# Patient Record
Sex: FEMALE | Race: WHITE | NOT HISPANIC OR LATINO | Employment: FULL TIME | URBAN - METROPOLITAN AREA
[De-identification: names, ages, dates, MRNs, and addresses within clinical notes are randomized per-mention and may not be internally consistent; named-entity substitution may affect disease eponyms.]

---

## 2017-08-03 ENCOUNTER — RECORDS - HEALTHEAST (OUTPATIENT)
Dept: LAB | Facility: CLINIC | Age: 42
End: 2017-08-03

## 2017-08-04 LAB — HIV 1+2 AB+HIV1 P24 AG SERPL QL IA: NEGATIVE

## 2017-08-05 LAB — SYPHILIS RPR SCREEN - HISTORICAL: NORMAL

## 2018-01-04 ENCOUNTER — OFFICE VISIT (OUTPATIENT)
Dept: URGENT CARE | Facility: URGENT CARE | Age: 43
End: 2018-01-04
Payer: COMMERCIAL

## 2018-01-04 VITALS
WEIGHT: 209 LBS | OXYGEN SATURATION: 100 % | TEMPERATURE: 97.5 F | DIASTOLIC BLOOD PRESSURE: 81 MMHG | SYSTOLIC BLOOD PRESSURE: 131 MMHG | HEART RATE: 87 BPM | HEIGHT: 67 IN | BODY MASS INDEX: 32.8 KG/M2

## 2018-01-04 DIAGNOSIS — R30.0 DYSURIA: Primary | ICD-10-CM

## 2018-01-04 DIAGNOSIS — N30.00 ACUTE CYSTITIS WITHOUT HEMATURIA: ICD-10-CM

## 2018-01-04 DIAGNOSIS — R82.90 NONSPECIFIC FINDING ON EXAMINATION OF URINE: ICD-10-CM

## 2018-01-04 LAB
ALBUMIN UR-MCNC: NEGATIVE MG/DL
APPEARANCE UR: CLEAR
BACTERIA #/AREA URNS HPF: ABNORMAL /HPF
BILIRUB UR QL STRIP: NEGATIVE
COLOR UR AUTO: YELLOW
GLUCOSE UR STRIP-MCNC: NEGATIVE MG/DL
HGB UR QL STRIP: ABNORMAL
KETONES UR STRIP-MCNC: NEGATIVE MG/DL
LEUKOCYTE ESTERASE UR QL STRIP: NEGATIVE
NITRATE UR QL: POSITIVE
NON-SQ EPI CELLS #/AREA URNS LPF: ABNORMAL /LPF
PH UR STRIP: 6 PH (ref 5–7)
RBC #/AREA URNS AUTO: ABNORMAL /HPF
SOURCE: ABNORMAL
SP GR UR STRIP: 1.01 (ref 1–1.03)
TRANS CELLS #/AREA URNS HPF: ABNORMAL /HPF
UROBILINOGEN UR STRIP-ACNC: 0.2 EU/DL (ref 0.2–1)
WBC #/AREA URNS AUTO: ABNORMAL /HPF

## 2018-01-04 PROCEDURE — 81001 URINALYSIS AUTO W/SCOPE: CPT | Performed by: FAMILY MEDICINE

## 2018-01-04 PROCEDURE — 87186 SC STD MICRODIL/AGAR DIL: CPT | Performed by: PHYSICIAN ASSISTANT

## 2018-01-04 PROCEDURE — 87086 URINE CULTURE/COLONY COUNT: CPT | Performed by: PHYSICIAN ASSISTANT

## 2018-01-04 PROCEDURE — 99203 OFFICE O/P NEW LOW 30 MIN: CPT | Performed by: PHYSICIAN ASSISTANT

## 2018-01-04 PROCEDURE — 87088 URINE BACTERIA CULTURE: CPT | Performed by: PHYSICIAN ASSISTANT

## 2018-01-04 RX ORDER — CEFDINIR 300 MG/1
300 CAPSULE ORAL 2 TIMES DAILY
Qty: 14 CAPSULE | Refills: 0 | Status: SHIPPED | OUTPATIENT
Start: 2018-01-04 | End: 2018-01-11

## 2018-01-04 RX ORDER — PHENAZOPYRIDINE HYDROCHLORIDE 200 MG/1
200 TABLET, FILM COATED ORAL 3 TIMES DAILY PRN
Qty: 6 TABLET | Refills: 0 | Status: SHIPPED | OUTPATIENT
Start: 2018-01-04 | End: 2018-01-06

## 2018-01-04 NOTE — MR AVS SNAPSHOT
"              After Visit Summary   2018    Mahogany Leal    MRN: [id number]           Patient Information     Date Of Birth          1975        Visit Information        Provider Department      2018 5:20 PM Олег Marlow PA-C [facility]        Today's Diagnoses     Dysuria    -  1    Acute cystitis without hematuria           Follow-ups after your visit        Who to contact     If you have questions or need follow up information about today's clinic visit or your schedule please contact [facility] directly at [phone].  Normal or non-critical lab and imaging results will be communicated to you by [employer], letter or phone within 4 business days after the clinic has received the results. If you do not hear from us within 7 days, please contact the clinic through [employer] or phone. If you have a critical or abnormal lab result, we will notify you by phone as soon as possible.  Submit refill requests through [employer] or call your pharmacy and they will forward the refill request to us. Please allow 3 business days for your refill to be completed.          Additional Information About Your Visit        MyChart Information     [employer] lets you send messages to your doctor, view your test results, renew your prescriptions, schedule appointments and more. To sign up, go to [email or web address] . Click on \"Log in\" on the left side of the screen, which will take you to the Welcome page. Then click on \"Sign up Now\" on the right side of the page.     You will be asked to enter the access code listed below, as well as some personal information. Please follow the directions to create your username and password.     Your access code is: [email or web address]  Expires: 2018  6:26 PM     Your access code will  in 90 days. If you need help or a new code, please call your [place] clinic or [phone].        Care EveryWhere ID     This is your Care EveryWhere " "ID. This could be used by other organizations to access your [place] medical records  [id number]        Your Vitals Were     Pulse Temperature Height Pulse Oximetry Breastfeeding? BMI (Body Mass Index)    87 97.5  F (36.4  C) (Tympanic) 5' 7\" (1.702 m) 100% No 32.73 kg/m2       Blood Pressure from Last 3 Encounters:   01/04/18 131/81    Weight from Last 3 Encounters:   01/04/18 209 lb (94.8 kg)              We Performed the Following     *UA reflex to Microscopic and Culture ([place] [facility] (except [place] and [place])     Urine Microscopic          Today's Medication Changes          These changes are accurate as of: 1/4/18  6:26 PM.  If you have any questions, ask your nurse or doctor.               Start taking these medicines.        Dose/Directions    cefdinir 300 MG capsule   Commonly known as:  OMNICEF   Used for:  Acute cystitis without hematuria, Dysuria   Started by:  Олег Marlow PA-C        Dose:  300 mg   Take 1 capsule (300 mg) by mouth 2 times daily for 7 days   Quantity:  14 capsule   Refills:  0       phenazopyridine 200 MG tablet   Commonly known as:  PYRIDIUM   Used for:  Dysuria, Acute cystitis without hematuria   Started by:  Олег Marlow PA-C        Dose:  200 mg   Take 1 tablet (200 mg) by mouth 3 times daily as needed for irritation   Quantity:  6 tablet   Refills:  0            Where to get your medicines      These medications were sent to [facility] [address] [place] [address], [place] - [address] [id number] [facility] [address]     Phone:  [phone]     cefdinir 300 MG capsule    phenazopyridine 200 MG tablet                Primary Care Provider Fax #    Physician No Ref-Primary [phone]       No address on file        Equal Access to Services     GERTRUDE LOPEZ : Jory Jaime, ana ferguson, brennen romero, emerson johnson. So [facility] [phone].    ATENCIÓN: Si nikki molina, " tiene a burnette disposición servicios gratuitos de asistencia lingüística. Amy ivey [phone].    We comply with applicable federal civil rights laws and [place] laws. We do not discriminate on the basis of race, color, national origin, age, disability, sex, sexual orientation, or gender identity.            Thank you!     Thank you for choosing [facility]  for your care. Our goal is always to provide you with excellent care. Hearing back from our patients is one way we can continue to improve our services. Please take a few minutes to complete the written survey that you may receive in the mail after your visit with us. Thank you!             Your Updated Medication List - Protect others around you: Learn how to safely use, store and throw away your medicines at www.disposemymeds.org.          This list is accurate as of: 1/4/18  6:26 PM.  Always use your most recent med list.                   Brand Name Dispense Instructions for use Diagnosis    cefdinir 300 MG capsule    OMNICEF    14 capsule    Take 1 capsule (300 mg) by mouth 2 times daily for 7 days    Acute cystitis without hematuria, Dysuria       phenazopyridine 200 MG tablet    PYRIDIUM    6 tablet    Take 1 tablet (200 mg) by mouth 3 times daily as needed for irritation    Dysuria, Acute cystitis without hematuria       VALACYCLOVIR HCL PO

## 2018-01-04 NOTE — NURSING NOTE
"Chief Complaint   Patient presents with     Urgent Care     UTI     c/o dysuria for 4 day       Initial /81  Pulse 87  Temp 97.5  F (36.4  C) (Tympanic)  Ht 5' 7\" (1.702 m)  Wt 209 lb (94.8 kg)  SpO2 100%  Breastfeeding? No  BMI 32.73 kg/m2 Estimated body mass index is 32.73 kg/(m^2) as calculated from the following:    Height as of this encounter: 5' 7\" (1.702 m).    Weight as of this encounter: 209 lb (94.8 kg).  Medication Reconciliation: complete   Karrie Hernandez MA    "

## 2018-01-05 NOTE — PROGRESS NOTES
"SUBJECTIVE:   Mahogany Leal is a 42 year old female who  presents today for a possible UTI. Symptoms of dysuria. Abdominal pressure, and nausea have been going on for 4 day(s).   Hematuria no.  gradual onsetand moderate.  There is no history of fever, chills, nausea or vomiting.  No history of vaginal or penile discharge. This patient does  have a history of urinary tract infections. Taking trimethoprim after intercourse regularly. Patient denies flank pain and temperature > 101 degrees F. or vaginal discharge, vaginal odor and vaginal itching     No past medical history on file.  Current Outpatient Prescriptions   Medication Sig Dispense Refill     VALACYCLOVIR HCL PO        Social History   Substance Use Topics     Smoking status: Never Smoker     Smokeless tobacco: Never Used     Alcohol use Not on file       ROS:   CONSTITUTIONAL:POSITIVE  for malaise  : dysuria    OBJECTIVE:  /81  Pulse 87  Temp 97.5  F (36.4  C) (Tympanic)  Ht 5' 7\" (1.702 m)  Wt 209 lb (94.8 kg)  SpO2 100%  Breastfeeding? No  BMI 32.73 kg/m2  GENERAL APPEARANCE: healthy, alert and no distress  RESP: lungs clear to auscultation - no rales, rhonchi or wheezes  CV: regular rates and rhythm, normal S1 S2, no murmur noted  ABDOMEN: tenderness mild suprapubic  BACK: No CVA tenderness  SKIN: no suspicious lesions or rashes    ASSESSMENT:     1. Dysuria    - *UA reflex to Microscopic and Culture ([place] [facility] (except [place] and [place])  - phenazopyridine (PYRIDIUM) 200 MG tablet; Take 1 tablet (200 mg) by mouth 3 times daily as needed for irritation  Dispense: 6 tablet; Refill: 0  - cefdinir (OMNICEF) 300 MG capsule; Take 1 capsule (300 mg) by mouth 2 times daily for 7 days  Dispense: 14 capsule; Refill: 0  - Urine Microscopic    2. Acute cystitis without hematuria    - phenazopyridine (PYRIDIUM) 200 MG tablet; Take 1 tablet (200 mg) by mouth 3 times daily as needed for irritation  Dispense: 6 tablet; Refill: 0  - " cefdinir (OMNICEF) 300 MG capsule; Take 1 capsule (300 mg) by mouth 2 times daily for 7 days  Dispense: 14 capsule; Refill: 0    PLAN:  As per ordered above  Drink plenty of fluids.  Prevention and treatment of UTI's discussed.Signs and symptoms of pyelonephritis mentioned.  Follow up with primary care physician if not improving over the next 3 days.

## 2018-01-07 LAB
BACTERIA SPEC CULT: ABNORMAL
SPECIMEN SOURCE: ABNORMAL

## 2018-09-13 ENCOUNTER — RECORDS - HEALTHEAST (OUTPATIENT)
Dept: LAB | Facility: CLINIC | Age: 43
End: 2018-09-13

## 2018-09-14 LAB
CHOLEST SERPL-MCNC: 182 MG/DL
FASTING STATUS PATIENT QL REPORTED: NORMAL
HDLC SERPL-MCNC: 58 MG/DL
LDLC SERPL CALC-MCNC: 101 MG/DL
TRIGL SERPL-MCNC: 117 MG/DL

## 2018-10-25 ENCOUNTER — RECORDS - HEALTHEAST (OUTPATIENT)
Dept: LAB | Facility: CLINIC | Age: 43
End: 2018-10-25

## 2018-10-27 LAB — BACTERIA SPEC CULT: ABNORMAL

## 2020-04-27 ENCOUNTER — RECORDS - HEALTHEAST (OUTPATIENT)
Dept: LAB | Facility: CLINIC | Age: 45
End: 2020-04-27

## 2020-04-28 LAB
ALBUMIN SERPL-MCNC: 3.8 G/DL (ref 3.5–5)
ALP SERPL-CCNC: 140 U/L (ref 45–120)
ALT SERPL W P-5'-P-CCNC: 152 U/L (ref 0–45)
ANION GAP SERPL CALCULATED.3IONS-SCNC: 14 MMOL/L (ref 5–18)
AST SERPL W P-5'-P-CCNC: 39 U/L (ref 0–40)
BILIRUB SERPL-MCNC: 0.4 MG/DL (ref 0–1)
BUN SERPL-MCNC: 19 MG/DL (ref 8–22)
CALCIUM SERPL-MCNC: 8.7 MG/DL (ref 8.5–10.5)
CHLORIDE BLD-SCNC: 104 MMOL/L (ref 98–107)
CO2 SERPL-SCNC: 18 MMOL/L (ref 22–31)
CREAT SERPL-MCNC: 0.68 MG/DL (ref 0.6–1.1)
GFR SERPL CREATININE-BSD FRML MDRD: >60 ML/MIN/1.73M2
GLUCOSE BLD-MCNC: 96 MG/DL (ref 70–125)
LIPASE SERPL-CCNC: 15 U/L (ref 0–52)
POTASSIUM BLD-SCNC: 3.8 MMOL/L (ref 3.5–5)
PROT SERPL-MCNC: 6.9 G/DL (ref 6–8)
SODIUM SERPL-SCNC: 136 MMOL/L (ref 136–145)

## 2020-05-05 ENCOUNTER — AMBULATORY - HEALTHEAST (OUTPATIENT)
Dept: ADMINISTRATIVE | Facility: CLINIC | Age: 45
End: 2020-05-05

## 2020-05-05 DIAGNOSIS — K80.18 CALCULUS OF GALLBLADDER WITH OTHER CHOLECYSTITIS WITHOUT OBSTRUCTION: ICD-10-CM

## 2020-05-08 ENCOUNTER — AMBULATORY - HEALTHEAST (OUTPATIENT)
Dept: SURGERY | Facility: CLINIC | Age: 45
End: 2020-05-08

## 2020-10-26 ENCOUNTER — TRANSFERRED RECORDS (OUTPATIENT)
Dept: NEUROLOGY | Facility: CLINIC | Age: 45
End: 2020-10-26

## 2020-11-06 ENCOUNTER — TRANSFERRED RECORDS (OUTPATIENT)
Dept: NEUROLOGY | Facility: CLINIC | Age: 45
End: 2020-11-06

## 2020-11-25 ENCOUNTER — RECORDS - HEALTHEAST (OUTPATIENT)
Dept: LAB | Facility: CLINIC | Age: 45
End: 2020-11-25

## 2020-11-25 LAB
ALBUMIN SERPL-MCNC: 3.9 G/DL (ref 3.5–5)
ALP SERPL-CCNC: 77 U/L (ref 45–120)
ALT SERPL W P-5'-P-CCNC: 15 U/L (ref 0–45)
ANION GAP SERPL CALCULATED.3IONS-SCNC: 12 MMOL/L (ref 5–18)
AST SERPL W P-5'-P-CCNC: 19 U/L (ref 0–40)
BILIRUB SERPL-MCNC: 0.3 MG/DL (ref 0–1)
BUN SERPL-MCNC: 20 MG/DL (ref 8–22)
CALCIUM SERPL-MCNC: 9 MG/DL (ref 8.5–10.5)
CHLORIDE BLD-SCNC: 103 MMOL/L (ref 98–107)
CO2 SERPL-SCNC: 24 MMOL/L (ref 22–31)
CREAT SERPL-MCNC: 0.74 MG/DL (ref 0.6–1.1)
GFR SERPL CREATININE-BSD FRML MDRD: >60 ML/MIN/1.73M2
GLUCOSE BLD-MCNC: 82 MG/DL (ref 70–125)
POTASSIUM BLD-SCNC: 3.9 MMOL/L (ref 3.5–5)
PROT SERPL-MCNC: 7.3 G/DL (ref 6–8)
SODIUM SERPL-SCNC: 139 MMOL/L (ref 136–145)

## 2020-11-27 LAB
HAV IGM SERPL QL IA: NEGATIVE
HBV CORE IGM SERPL QL IA: NEGATIVE
HBV SURFACE AG SERPL QL IA: NEGATIVE
HCV AB SERPL QL IA: NEGATIVE

## 2021-04-26 ENCOUNTER — COMMUNICATION - HEALTHEAST (OUTPATIENT)
Dept: CARDIOLOGY | Facility: CLINIC | Age: 46
End: 2021-04-26

## 2021-05-25 ENCOUNTER — RECORDS - HEALTHEAST (OUTPATIENT)
Dept: ADMINISTRATIVE | Facility: CLINIC | Age: 46
End: 2021-05-25

## 2021-05-26 ENCOUNTER — RECORDS - HEALTHEAST (OUTPATIENT)
Dept: ADMINISTRATIVE | Facility: CLINIC | Age: 46
End: 2021-05-26

## 2021-06-01 ENCOUNTER — RECORDS - HEALTHEAST (OUTPATIENT)
Dept: ADMINISTRATIVE | Facility: CLINIC | Age: 46
End: 2021-06-01

## 2021-06-02 ENCOUNTER — RECORDS - HEALTHEAST (OUTPATIENT)
Dept: ADMINISTRATIVE | Facility: CLINIC | Age: 46
End: 2021-06-02

## 2021-06-16 PROBLEM — A60.00 GENITAL HERPES SIMPLEX: Status: ACTIVE | Noted: 2020-05-08

## 2021-06-16 PROBLEM — J20.9 ACUTE BRONCHITIS: Status: ACTIVE | Noted: 2020-05-08

## 2021-06-16 PROBLEM — F41.9 ANXIETY: Status: ACTIVE | Noted: 2020-05-08

## 2021-06-16 PROBLEM — D25.9 UTERINE LEIOMYOMA: Status: ACTIVE | Noted: 2020-05-08

## 2021-06-19 ENCOUNTER — HEALTH MAINTENANCE LETTER (OUTPATIENT)
Age: 46
End: 2021-06-19

## 2021-10-10 ENCOUNTER — HEALTH MAINTENANCE LETTER (OUTPATIENT)
Age: 46
End: 2021-10-10

## 2021-12-17 ENCOUNTER — MEDICAL CORRESPONDENCE (OUTPATIENT)
Dept: HEALTH INFORMATION MANAGEMENT | Facility: CLINIC | Age: 46
End: 2021-12-17
Payer: COMMERCIAL

## 2021-12-17 ENCOUNTER — TRANSCRIBE ORDERS (OUTPATIENT)
Dept: OTHER | Age: 46
End: 2021-12-17

## 2021-12-17 DIAGNOSIS — R51.9 CHRONIC DAILY HEADACHE: Primary | ICD-10-CM

## 2022-01-12 ENCOUNTER — LAB REQUISITION (OUTPATIENT)
Dept: LAB | Facility: CLINIC | Age: 47
End: 2022-01-12
Payer: COMMERCIAL

## 2022-01-12 DIAGNOSIS — N39.0 URINARY TRACT INFECTION, SITE NOT SPECIFIED: ICD-10-CM

## 2022-01-12 PROCEDURE — 87086 URINE CULTURE/COLONY COUNT: CPT | Mod: ORL | Performed by: FAMILY MEDICINE

## 2022-01-14 LAB — BACTERIA UR CULT: ABNORMAL

## 2022-03-25 ENCOUNTER — TRANSFERRED RECORDS (OUTPATIENT)
Dept: NEUROLOGY | Facility: CLINIC | Age: 47
End: 2022-03-25

## 2022-04-01 ENCOUNTER — TRANSFERRED RECORDS (OUTPATIENT)
Dept: NEUROLOGY | Facility: CLINIC | Age: 47
End: 2022-04-01

## 2022-06-27 ENCOUNTER — LAB REQUISITION (OUTPATIENT)
Dept: LAB | Facility: CLINIC | Age: 47
End: 2022-06-27
Payer: COMMERCIAL

## 2022-06-27 DIAGNOSIS — N30.90 CYSTITIS, UNSPECIFIED WITHOUT HEMATURIA: ICD-10-CM

## 2022-06-27 PROCEDURE — 87186 SC STD MICRODIL/AGAR DIL: CPT | Mod: ORL | Performed by: FAMILY MEDICINE

## 2022-06-27 PROCEDURE — 87086 URINE CULTURE/COLONY COUNT: CPT | Mod: ORL | Performed by: FAMILY MEDICINE

## 2022-06-29 LAB — BACTERIA UR CULT: ABNORMAL

## 2022-06-30 ENCOUNTER — TRANSFERRED RECORDS (OUTPATIENT)
Dept: NEUROLOGY | Facility: CLINIC | Age: 47
End: 2022-06-30

## 2022-07-08 ENCOUNTER — LAB (OUTPATIENT)
Dept: LAB | Facility: HOSPITAL | Age: 47
End: 2022-07-08
Payer: COMMERCIAL

## 2022-07-08 ENCOUNTER — OFFICE VISIT (OUTPATIENT)
Dept: NEUROLOGY | Facility: CLINIC | Age: 47
End: 2022-07-08
Attending: PHYSICIAN ASSISTANT
Payer: COMMERCIAL

## 2022-07-08 VITALS
SYSTOLIC BLOOD PRESSURE: 121 MMHG | DIASTOLIC BLOOD PRESSURE: 82 MMHG | HEIGHT: 67 IN | BODY MASS INDEX: 32.83 KG/M2 | HEART RATE: 77 BPM | WEIGHT: 209.2 LBS

## 2022-07-08 DIAGNOSIS — G43.719 INTRACTABLE CHRONIC MIGRAINE WITHOUT AURA AND WITHOUT STATUS MIGRAINOSUS: Primary | ICD-10-CM

## 2022-07-08 DIAGNOSIS — G43.719 INTRACTABLE CHRONIC MIGRAINE WITHOUT AURA AND WITHOUT STATUS MIGRAINOSUS: ICD-10-CM

## 2022-07-08 DIAGNOSIS — M79.10 MUSCLE PAIN: ICD-10-CM

## 2022-07-08 LAB
ALBUMIN SERPL-MCNC: 3.7 G/DL (ref 3.5–5)
ALP SERPL-CCNC: 47 U/L (ref 45–120)
ALT SERPL W P-5'-P-CCNC: 17 U/L (ref 0–45)
ANION GAP SERPL CALCULATED.3IONS-SCNC: 5 MMOL/L (ref 5–18)
AST SERPL W P-5'-P-CCNC: 18 U/L (ref 0–40)
BILIRUB SERPL-MCNC: 0.4 MG/DL (ref 0–1)
BUN SERPL-MCNC: 15 MG/DL (ref 8–22)
CALCIUM SERPL-MCNC: 8.8 MG/DL (ref 8.5–10.5)
CHLORIDE BLD-SCNC: 107 MMOL/L (ref 98–107)
CK SERPL-CCNC: 37 U/L (ref 30–190)
CO2 SERPL-SCNC: 27 MMOL/L (ref 22–31)
CREAT SERPL-MCNC: 0.75 MG/DL (ref 0.6–1.1)
FERRITIN SERPL-MCNC: 131 NG/ML (ref 6–175)
GFR SERPL CREATININE-BSD FRML MDRD: >90 ML/MIN/1.73M2
GLUCOSE BLD-MCNC: 89 MG/DL (ref 70–125)
HBA1C MFR BLD: 5.1 %
MAGNESIUM SERPL-MCNC: 2.1 MG/DL (ref 1.8–2.6)
POTASSIUM BLD-SCNC: 4 MMOL/L (ref 3.5–5)
PROT SERPL-MCNC: 7.1 G/DL (ref 6–8)
SODIUM SERPL-SCNC: 139 MMOL/L (ref 136–145)
TOTAL PROTEIN SERUM FOR ELP: 6.9 G/DL (ref 6.4–8.3)
TSH SERPL DL<=0.005 MIU/L-ACNC: 2.76 UIU/ML (ref 0.3–5)
VIT B12 SERPL-MCNC: 411 PG/ML (ref 232–1245)

## 2022-07-08 PROCEDURE — 84155 ASSAY OF PROTEIN SERUM: CPT | Mod: 91

## 2022-07-08 PROCEDURE — 82550 ASSAY OF CK (CPK): CPT

## 2022-07-08 PROCEDURE — 86334 IMMUNOFIX E-PHORESIS SERUM: CPT | Performed by: PATHOLOGY

## 2022-07-08 PROCEDURE — 84443 ASSAY THYROID STIM HORMONE: CPT

## 2022-07-08 PROCEDURE — 82728 ASSAY OF FERRITIN: CPT

## 2022-07-08 PROCEDURE — 36415 COLL VENOUS BLD VENIPUNCTURE: CPT

## 2022-07-08 PROCEDURE — 83735 ASSAY OF MAGNESIUM: CPT

## 2022-07-08 PROCEDURE — 83036 HEMOGLOBIN GLYCOSYLATED A1C: CPT

## 2022-07-08 PROCEDURE — 82607 VITAMIN B-12: CPT

## 2022-07-08 PROCEDURE — 84425 ASSAY OF VITAMIN B-1: CPT

## 2022-07-08 PROCEDURE — 86038 ANTINUCLEAR ANTIBODIES: CPT

## 2022-07-08 PROCEDURE — 84165 PROTEIN E-PHORESIS SERUM: CPT | Mod: TC | Performed by: PATHOLOGY

## 2022-07-08 PROCEDURE — 99205 OFFICE O/P NEW HI 60 MIN: CPT | Performed by: PSYCHIATRY & NEUROLOGY

## 2022-07-08 PROCEDURE — 80053 COMPREHEN METABOLIC PANEL: CPT

## 2022-07-08 RX ORDER — NORTRIPTYLINE HCL 10 MG
CAPSULE ORAL
Qty: 180 CAPSULE | Refills: 1 | Status: SHIPPED | OUTPATIENT
Start: 2022-07-08 | End: 2022-09-20

## 2022-07-08 RX ORDER — DULOXETIN HYDROCHLORIDE 60 MG/1
60 CAPSULE, DELAYED RELEASE ORAL
COMMUNITY
End: 2024-01-15

## 2022-07-08 RX ORDER — TRIMETHOPRIM 100 MG/1
100 TABLET ORAL
COMMUNITY
End: 2024-01-15

## 2022-07-08 RX ORDER — MELOXICAM 10 MG/1
CAPSULE ORAL
COMMUNITY

## 2022-07-08 NOTE — PROGRESS NOTES
NEUROLOGY OUTPATIENT CONSULT NOTE   Jul 8, 2022     CHIEF COMPLAINT/REASON FOR VISIT/REASON FOR CONSULT  Patient presents with:  Headache: New patient     REASON FOR CONSULTATION- Headaches and body pain     REFERRAL SOURCE  Dr. Dallin Loredo   Dr. Dallin Loredo    HISTORY OF PRESENT ILLNESS  Mahogany Leal is a 47 year old female seen today for evaluation of headaches and body pain.  All her symptoms started 15 years ago.  There was no provoking factor 15 years ago.    1.  Headaches these are almost every day.  They can be more in the back of the head but sometimes radiate to the front.  They are more of a pressure with no significant photophobia or phonophobia.  She occasionally does get visual auras.  They can be all day long.  They do go away when she goes to sleep.  Denies any other provoking factors.  Has not tried any medications.  Does use Tylenol for abortive therapy.  She does use Tylenol multiple times a day.  She is also using it for dental bridging pain.  There is question of TMJ also.  Has had a MRI 15 years ago that was negative.    2.  She reports generalized arm and leg pain.  This is more of a dull bruising pain though occasionally can be sharp.  Activity does make it worse.  There is no associated numbness or weakness though reports that sometimes her arms feel heavy.  She does complain of some neck pain and low back pain.  She has had some MRIs of her low back done through [facility].  Recently had an x-ray of her neck done through [facility] both of these were noncontributory (per patient report).  Was seen by her rheumatologist 15 years ago and was thought to have fibromyalgia versus connective tissue disease.  Sitting for too long as well because cramping in the back of her legs.    Previous history is reviewed and this is unchanged.    PAST MEDICAL/SURGICAL HISTORY  Past Medical History:   Diagnosis Date     Anxiety      Chronic headaches      Costochondritis       "Genital herpes simplex type 1 infection      Mononucleosis      Recurrent UTI      Patient Active Problem List   Diagnosis     Abnormal uterine bleeding     Anxiety     Genital herpes simplex     Acute bronchitis     Uterine leiomyoma   Significant for frequent UTIs    FAMILY HISTORY  Family History   Problem Relation Age of Onset     Cancer Mother         Soft Palate     Aneurysm Mother         Brain     Nephrolithiasis Father      Colon Cancer Father 58.00     Colon Cancer Maternal Grandmother 65.00     Diabetes Maternal Grandfather      Cerebrovascular Disease Maternal Grandfather    Family history positive for rheumatoid arthritis in her mother.  No family history of headaches.    SOCIAL HISTORY  Social History     Tobacco Use     Smoking status: Never Smoker     Smokeless tobacco: Never Used   Substance Use Topics     Alcohol use: Not Currently     Comment: Alcoholic Drinks/day: rare     Drug use: No       SYSTEMS REVIEW  Twelve-system ROS was done and other than the HPI this was negative except for some back pain and arm and leg pain and sometimes neck pain, bladder symptoms, headaches, anxiety.  No sleep issues.    MEDICATIONS  DULoxetine (CYMBALTA) 60 MG capsule, Take 60 mg by mouth  trimethoprim (TRIMPEX) 100 MG tablet, Take 100 mg by mouth  VALACYCLOVIR HCL PO,   Meloxicam 10 MG CAPS, 1 tablet daily (Patient not taking: Reported on 7/8/2022)    No current facility-administered medications on file prior to visit.       PHYSICAL EXAMINATION  VITALS: /82 (BP Location: Right arm, Patient Position: Sitting)   Pulse 77   Ht 1.702 m (5' 7\")   Wt 94.9 kg (209 lb 3.2 oz)   BMI 32.77 kg/m    GENERAL: Healthy appearing, alert, no acute distress, normal habitus.  CARDIOVASCULAR: Extremities warm and well perfused. Pulses present.   EYES: Funduscopic exam limited.  NEUROLOGICAL:  Patient is awake and oriented to self, place and time.  Attention span is normal.  Memory is grossly intact.  Language is fluent " and follows commands appropriately.  Appropriate fund of knowledge. Cranial nerves 2-12 are intact. There is no pronator drift.  Motor exam shows 5/5 strength in all extremities.  Tone is symmetric bilaterally in upper and lower extremities.  Reflexes are symmetric and 2+ in upper extremities and 2+ brisk in lower extremities. Sensory exam is grossly intact to light touch, pin prick and vibration.  Finger to nose and heel to shin is without dysmetria.  Romberg is negative.  Gait is normal and the patient is able to do tandem walk and walk on toes and heels.    DIAGNOSTICS  OUTSIDE RECORDS  Outside referral notes reviewed.    MRI brain per patient report negative    IMPRESSION/REPORT/PLAN  Intractable chronic migraine without aura and without status migrainosus  Muscle pain  Question of medication overuse headaches    This is a 47 year old female with headaches and generalized body pain    1.  Headaches:-This is suggestive of chronic migraines based on description.  She reportedly had an MRI of the brain 15 years ago that was negative.  There might be a component of medication overuse headaches since she has been using abortive medication multiple times a day.  She is also using the Tylenol for some dental issues.  She does have some TMJ issues as well which could be causing the headaches.  All right I will put her on nortriptyline and see how she does with prevention of headaches.  Can continue using Tylenol for abortive therapy though should cut down on overusing Tylenol.  Could consider repeating the MRI if headaches are refractory to medication.    2.  Her generalized body pain would be nonneurological and possibly related to fibromyalgia.  She does complain of some muscle cramping with activity and would like to rule out neuromuscular disease.  We will check a EMG of the arm and leg and check blood work to look for cause of neuropathy/myelopathy.  We will get outside imaging from [facility].  Potentially  she will also need an MRI of the cervical spine since she does have slight hyperreflexia.  We will hold off on this for right now.  Could consider getting a second opinion from rheumatology if neurological work-up is negative.    I can see her back in 1 month after testing.    -     nortriptyline (PAMELOR) 10 MG capsule; Take 1 cap/night and then increase by 1 cap/week to a max of 3 caps/night as needed and tolerated.  -     Vitamin B12; Future  -     TSH with free T4 reflex; Future  -     Vitamin B1 whole blood; Future  -     Protein Immunofixation Serum; Future  -     Protein electrophoresis; Future  -     Magnesium; Future  -     CK total; Future  -     Comprehensive metabolic panel; Future  -     Anti Nuclear Erica IgG by IFA with Reflex; Future  -     Ferritin; Future  -     Hemoglobin A1c; Future  -     EMG; Future  - Request outside imaging of neck and back from [facility].    Return in about 1 month (around 8/8/2022) for In-Clinic Visit (must), After testing.    Over 65 minutes were spent coordinating the care for the patient on the day of the encounter.  This includes previsit, during visit and post visit activities as documented above.  Counseling patient.  Multiple medical problems reviewed/addressed.  Prescription management.  Multiple test ordered.  (Activities include but not inclusive of reviewing chart, reviewing outside records, reviewing labs and imaging study results as well as the images, patient visit time including getting history and exam,  use if applicable, review of test results with the patient and coming up with a plan in a shared model, counseling patient and family, education and answering patient questions, EMR , EMR diagnosis entry and problem list management, medication reconciliation and prescription management if applicable, paperwork if applicable, printing documents and documentation of the visit activities.)  Billing:-4 data points, 4 problem  points      Gen Aceves MD  Neurologist  [facility]  Tel:- [phone]    This note was dictated using voice recognition software.  Any grammatical or context distortions are unintentional and inherent to the software.

## 2022-07-11 LAB
ALBUMIN SERPL ELPH-MCNC: 4.1 G/DL (ref 3.7–5.1)
ALPHA1 GLOB SERPL ELPH-MCNC: 0.3 G/DL (ref 0.2–0.4)
ALPHA2 GLOB SERPL ELPH-MCNC: 0.6 G/DL (ref 0.5–0.9)
ANA SER QL IF: NEGATIVE
B-GLOBULIN SERPL ELPH-MCNC: 0.8 G/DL (ref 0.6–1)
GAMMA GLOB SERPL ELPH-MCNC: 1.1 G/DL (ref 0.7–1.6)
M PROTEIN SERPL ELPH-MCNC: 0 G/DL
PROT PATTERN SERPL ELPH-IMP: NORMAL
PROT PATTERN SERPL IFE-IMP: NORMAL

## 2022-07-11 PROCEDURE — 86334 IMMUNOFIX E-PHORESIS SERUM: CPT | Mod: 26 | Performed by: PATHOLOGY

## 2022-07-11 PROCEDURE — 84165 PROTEIN E-PHORESIS SERUM: CPT | Mod: 26 | Performed by: PATHOLOGY

## 2022-07-12 LAB — VIT B1 PYROPHOSHATE BLD-SCNC: 73 NMOL/L

## 2022-07-16 ENCOUNTER — HEALTH MAINTENANCE LETTER (OUTPATIENT)
Age: 47
End: 2022-07-16

## 2022-08-25 ENCOUNTER — TELEPHONE (OUTPATIENT)
Dept: NEUROLOGY | Facility: CLINIC | Age: 47
End: 2022-08-25

## 2022-08-25 NOTE — TELEPHONE ENCOUNTER
[facility] Call Center    Phone Message    May a detailed message be left on voicemail: yes     Reason for Call: Other: Pt is calling because she is getting an EMG done on 09/20. She would like to get a cortisone injection through her PCP but she wants to make sure it is safe to get and that it won't affect the results of her EMG. She would like a call back to make sure it's okay.     Action Taken: Message routed to:  Other: [facility]    Travel Screening: Not Applicable

## 2022-08-29 NOTE — TELEPHONE ENCOUNTER
Called and spoke with patient and notified her that it was ok per Dr. Aceves.  Emperatriz Pate MA,CMA,3:35 PM

## 2022-09-17 ENCOUNTER — HEALTH MAINTENANCE LETTER (OUTPATIENT)
Age: 47
End: 2022-09-17

## 2022-09-20 ENCOUNTER — OFFICE VISIT (OUTPATIENT)
Dept: NEUROLOGY | Facility: CLINIC | Age: 47
End: 2022-09-20

## 2022-09-20 ENCOUNTER — OFFICE VISIT (OUTPATIENT)
Dept: NEUROLOGY | Facility: CLINIC | Age: 47
End: 2022-09-20
Attending: PSYCHIATRY & NEUROLOGY
Payer: COMMERCIAL

## 2022-09-20 VITALS
WEIGHT: 194 LBS | SYSTOLIC BLOOD PRESSURE: 137 MMHG | HEART RATE: 72 BPM | RESPIRATION RATE: 16 BRPM | BODY MASS INDEX: 30.38 KG/M2 | DIASTOLIC BLOOD PRESSURE: 87 MMHG

## 2022-09-20 DIAGNOSIS — M79.10 MUSCLE PAIN: ICD-10-CM

## 2022-09-20 DIAGNOSIS — G43.719 INTRACTABLE CHRONIC MIGRAINE WITHOUT AURA AND WITHOUT STATUS MIGRAINOSUS: Primary | ICD-10-CM

## 2022-09-20 DIAGNOSIS — G43.719 INTRACTABLE CHRONIC MIGRAINE WITHOUT AURA AND WITHOUT STATUS MIGRAINOSUS: ICD-10-CM

## 2022-09-20 DIAGNOSIS — M79.7 FIBROMYALGIA: ICD-10-CM

## 2022-09-20 PROCEDURE — 95886 MUSC TEST DONE W/N TEST COMP: CPT | Mod: RT | Performed by: PSYCHIATRY & NEUROLOGY

## 2022-09-20 PROCEDURE — 95911 NRV CNDJ TEST 9-10 STUDIES: CPT | Performed by: PSYCHIATRY & NEUROLOGY

## 2022-09-20 PROCEDURE — 99215 OFFICE O/P EST HI 40 MIN: CPT | Performed by: PSYCHIATRY & NEUROLOGY

## 2022-09-20 RX ORDER — DULOXETIN HYDROCHLORIDE 30 MG/1
CAPSULE, DELAYED RELEASE ORAL EVERY 24 HOURS
COMMUNITY
End: 2024-01-15

## 2022-09-20 RX ORDER — DULOXETIN HYDROCHLORIDE 60 MG/1
CAPSULE, DELAYED RELEASE ORAL EVERY 24 HOURS
COMMUNITY
End: 2022-09-20

## 2022-09-20 RX ORDER — NORTRIPTYLINE HCL 10 MG
CAPSULE ORAL
Qty: 360 CAPSULE | Refills: 1 | Status: SHIPPED | OUTPATIENT
Start: 2022-09-20 | End: 2023-02-24

## 2022-09-20 NOTE — PROCEDURES
[facility]     [facility]ly [facility]  [address] [place] [address], [address]  [place] [address]  Tel: [phone]  Fax: [phone]          Full Name: Mahogany Leal Gender: Female  Patient ID: [id number] YOB: 1975      Visit Date: 9/20/2022 11:07  Age: 47 Years 5 Months Old  Interpreted By: Gen Aceves MD   Ref Dr.: Anup Todd MD  Tech:    Height: 5 feet 7 inch  Reason for referral: Evaluate right upper/right lower. c/o generalized pain in arms/legs > 10 years.       Motor NCS      Nerve / Sites Lat Amp Dist Los    ms mV cm m/s   R Median - APB      Wrist 3.28 8.7 7       Elbow 7.55 7.9 25.5 60   R Ulnar - ADM      Wrist 2.45 15.1 7       B.Elbow 5.68 14.7 20 62      A.Elbow 7.60 14.4 12 62   R Peroneal - EDB      Ankle 4.53 2.6 8       Fib head 11.46 2.2 30 43      Pop fossa 13.80 2.2 11 47   R Tibial - AH      Ankle 4.17 11.8 8       Pop fossa 13.07 11.3 37 42       F  Wave      Nerve Fmin    ms   R Ulnar - ADM 27.81   R Tibial - AH 54.17       Sensory NCS      Nerve / Sites Onset Lat Pk Lat Amp.2-3 Dist Los Lat Diff    ms ms  V cm m/s ms   R Median - II (Antidr)      Wrist 2.08 2.71 48.4 13 62    R Ulnar - V (Antidr)      Wrist 2.14 2.92 27.0 11 52    R Median, Ulnar - Transcarpal comparison      Median Palm 1.30 1.82 91.9 8 61       Ulnar Palm 1.25 1.77 43.7 8 64          0.05   R Sural - Ankle (Calf)      Calf 3.02 3.80 14.0 14 46    R Superficial peroneal - Ankle      Lat leg 2.45 3.02 9.6 12 49        EMG Summary Table     Spontaneous MUAP Rcmt Note   Muscle Fib PSW Fasc IA # Amp Dur PPP Rate Type   R. Gluteus medius None None None N N N N N N N   R. Gluteus nata None None None N N N N N N N   R. Upper paraspinal None None None N N N N N N N   R. Middle paraspinal None None None N N N N N N N   R. Lower paraspinal None None None N N N N N N N   R. Adductor shauna None None None N N N N N N N   R. Tibialis anterior None None None N N N  N N N N   R. Gastrocnemius (Medial head) None None None N N N N N N N   R. Brachioradialis None None None N N N N N N N   R. Pronator teres None None None N N N N N N N   R. Biceps brachii None None None N N N N N N N   R. Deltoid None None None N N N N N N N   R. Triceps brachii None None None N N N N N N N   R. Flexor carpi ulnaris None None None N N N N N N N   R. First dorsal interosseous None None None N N N N N N N   R. Abductor pollicis brevis None None None N N N N N N N     SUMMARY  Nerve conduction and EMG study of right upper and lower extremities shows:    Normal right median nerve distal motor latency, amplitude and conduction velocity.  Normal right ulnar distal motor latency, amplitude and conduction velocity.  Normal right median and ulnar sensory SNAPs.  Normal right median-ulnar transcarpal peak latency comparison.  Normal right peroneal distal motor latency with low normal amplitude and normal conduction velocity.  Normal right tibial distal motor latency, amplitude and conduction velocity.  Normal right sural and superficial peroneal sensory SNAP.  Monopolar needle exam is normal.      CLINICAL INTERPRETATION:  This is a normal nerve conduction and EMG study.  The low normal amplitude of the right peroneal nerve is of unclear significance and most likely artifactual.  Further clinical correlation is needed.     Gen Aceves MD  Neurologist  [facility]  Tel:- [phone]

## 2022-09-20 NOTE — LETTER
9/20/2022         RE: Mahogany Leal  [address]  [facility] [address]        Dear Colleague,    Thank you for referring your patient, Mahogany Leal, to the [facility] NEUROLOGY CLINIC [place]. Please see a copy of my visit note below.    NEUROLOGY OUTPATIENT PROGRESS NOTE   Sep 20, 2022     CHIEF COMPLAINT/REASON FOR VISIT/REASON FOR CONSULT  Patient presents with:  Follow Up: EMG results     REASON FOR CONSULTATION- Headaches and body pain     REFERRAL SOURCE  Dr. Dallin Loredo  CC Dr. Dallin Loredo    HISTORY OF PRESENT ILLNESS  Mahogany Leal is a 47 year old female seen today for evaluation of headaches and body pain.  All her symptoms started 15 years ago.  There was no provoking factor 15 years ago.    1.  Headaches these are almost every day.  They can be more in the back of the head but sometimes radiate to the front.  They are more of a pressure with no significant photophobia or phonophobia.  She occasionally does get visual auras.  They can be all day long.  They do go away when she goes to sleep.  Denies any other provoking factors.  Has not tried any medications.  Does use Tylenol for abortive therapy.  She does use Tylenol multiple times a day.  She is also using it for dental bridging pain.  There is question of TMJ also.  Has had a MRI 15 years ago that was negative.    2.  She reports generalized arm and leg pain.  This is more of a dull bruising pain though occasionally can be sharp.  Activity does make it worse.  There is no associated numbness or weakness though reports that sometimes her arms feel heavy.  She does complain of some neck pain and low back pain.  She has had some MRIs of her low back done through [facility].  Recently had an x-ray of her neck done through [facility] both of these were noncontributory (per patient report).  Was seen by her rheumatologist 15 years ago and was thought to have fibromyalgia versus connective tissue disease.   Sitting for too long as well because cramping in the back of her legs.    9/20/22  Patient returns today  1.  Headaches have slowly gotten little bit better with the nortriptyline.  She is only given 20 mg of nortriptyline.  Sleeping better at night with the nortriptyline.  No side effects.  Has not gotten beyond the 20 mg as she is also on Cymbalta.  Headaches are still every day but less severe compared to before.  Tylenol generally works for abortive therapy.  He is not taking any prescription medications for headaches  2.  Continues to have the muscle pain all over.  Has not had an MRI done through [facility] and would like to schedule one of her neck.  Discussed about possibility of fibromyalgia.  She has tried gabapentin in the past but wants to give another try.  Cymbalta does not help with the muscle pain.  No other new concerns today.      Previous history is reviewed and this is unchanged.    PAST MEDICAL/SURGICAL HISTORY  Past Medical History:   Diagnosis Date     Anxiety      Chronic headaches      Costochondritis      Genital herpes simplex type 1 infection      Mononucleosis      Recurrent UTI      Patient Active Problem List   Diagnosis     Abnormal uterine bleeding     Anxiety     Genital herpes simplex     Acute bronchitis     Uterine leiomyoma   Significant for frequent UTIs    FAMILY HISTORY  Family History   Problem Relation Age of Onset     Cancer Mother         Soft Palate     Aneurysm Mother         Brain     Nephrolithiasis Father      Colon Cancer Father 58.00     Colon Cancer Maternal Grandmother 65.00     Diabetes Maternal Grandfather      Cerebrovascular Disease Maternal Grandfather    Family history positive for rheumatoid arthritis in her mother.  No family history of headaches.    SOCIAL HISTORY  Social History     Tobacco Use     Smoking status: Never Smoker     Smokeless tobacco: Never Used   Substance Use Topics     Alcohol use: Not Currently     Comment: Alcoholic  Drinks/day: rare     Drug use: No       SYSTEMS REVIEW  Twelve-system ROS was done and other than the HPI this was negative except for some back pain and arm and leg pain and sometimes neck pain, bladder symptoms, headaches, anxiety.  No sleep issues.  No new concerns reported today.    MEDICATIONS  DULoxetine (CYMBALTA) 30 MG capsule, every 24 hours  DULoxetine (CYMBALTA) 60 MG capsule, Take 60 mg by mouth  Meloxicam 10 MG CAPS, 1 tablet daily  trimethoprim (TRIMPEX) 100 MG tablet, Take 100 mg by mouth  VALACYCLOVIR HCL PO,     No current facility-administered medications on file prior to visit.       PHYSICAL EXAMINATION  VITALS: /87   Pulse 72   Resp 16   Wt 88 kg (194 lb)   BMI 30.38 kg/m    GENERAL: Healthy appearing, alert, no acute distress, normal habitus.  CARDIOVASCULAR: Extremities warm and well perfused. Pulses present.   NEUROLOGICAL:  Patient is awake and oriented to self, place and time.  Attention span is normal.  Memory is grossly intact.  Language is fluent and follows commands appropriately.  Appropriate fund of knowledge. Cranial nerves 2-12 are intact. There is no pronator drift.  Motor exam shows 5/5 strength in all extremities.  Tone is symmetric bilaterally in upper and lower extremities.  (Previously reflexes are symmetric and 2+ in upper extremities and 2+ brisk in lower extremities. Sensory exam is grossly intact to light touch, pin prick and vibration.)  Finger to nose and heel to shin is without dysmetria.  Romberg is negative.  Gait is normal and the patient is able to do tandem walk and walk on toes and heels.  Exam similar to before.    DIAGNOSTICS  OUTSIDE RECORDS  Outside referral notes reviewed.    MRI brain per patient report negative    LABS  Component      Latest Ref Rng & Units 7/8/2022   Sodium      136 - 145 mmol/L 139   Potassium      3.5 - 5.0 mmol/L 4.0   Chloride      98 - 107 mmol/L 107   Carbon Dioxide      22 - 31 mmol/L 27   Anion Gap      5 - 18 mmol/L 5    Urea Nitrogen      8 - 22 mg/dL 15   Creatinine      0.60 - 1.10 mg/dL 0.75   Calcium      8.5 - 10.5 mg/dL 8.8   Glucose      70 - 125 mg/dL 89   Alkaline Phosphatase      45 - 120 U/L 47   AST      0 - 40 U/L 18   ALT      0 - 45 U/L 17   Protein Total      6.0 - 8.0 g/dL 7.1   Albumin      3.5 - 5.0 g/dL 3.7   Bilirubin Total      0.0 - 1.0 mg/dL 0.4   GFR Estimate      >60 mL/min/1.73m2 >90   Vitamin B12      232 - 1,245 pg/mL 411   TSH      0.30 - 5.00 uIU/mL 2.76   Vitamin B1 Whole Blood Level      70 - 180 nmol/L 73   Magnesium      1.8 - 2.6 mg/dL 2.1   CK Total      30 - 190 U/L 37   MIRNA interpretation      Negative Negative   Ferritin      6 - 175 ng/mL 131   Hemoglobin A1C      <=5.6 % 5.1     Component      Latest Ref Rng & Units 7/8/2022   Albumin Fraction      3.7 - 5.1 g/dL 4.1   Alpha 1 Fraction      0.2 - 0.4 g/dL 0.3   Alpha 2 Fraction      0.5 - 0.9 g/dL 0.6   Beta Fraction      0.6 - 1.0 g/dL 0.8   Gamma Fraction      0.7 - 1.6 g/dL 1.1   Monoclonal Peak      <=0.0 g/dL 0.0   ELP Interpretation:       Essentially normal electrophoretic pattern. No obvious monoclonal proteins seen. Pathologic significance requires clinical correlation. Alex Del Real M.D., Ph.D., Pathologist.   Immunofixation ELP       No monoclonal protein seen on immunofixation. Pathologic significance requires clinical correlation. Alex Del Real M.D., Ph.D., Pathologist   Total Protein Serum for ELP      6.4 - 8.3 g/dL 6.9     EMG  CLINICAL INTERPRETATION:  This is a normal nerve conduction and EMG study.  The low normal amplitude of the right peroneal nerve is of unclear significance and most likely artifactual.  Further clinical correlation is needed.         IMPRESSION/REPORT/PLAN  Intractable chronic migraine without aura and without status migrainosus  Muscle pain  Question of medication overuse headaches  Rule out fibromyalgia    This is a 47 year old female with headaches and generalized body pain    1.   Headaches:-This is suggestive of chronic migraines based on description.  She reportedly had an MRI of the brain 15 years ago that was negative.  There might be a component of medication overuse headaches since she has been using abortive medication multiple times a day.  She is also using the Tylenol for some dental issues.  She does have some TMJ issues as well which could be causing the headaches.  Currently with the use of nortriptyline headaches have come less severe though still every day.  She is currently on the 20 mg dose.  Would encourage her to further go up on the nortriptyline to see if that further helps.  Can continue Tylenol for abortive therapy though not overusing it all the time.  Could consider repeating the MRI if headaches are refractory to medication.    2.  Her generalized body pain would be nonneurological and possibly related to fibromyalgia.  She does complain of some muscle cramping with activity and would like to rule out neuromuscular disease.  EMG was noncontributory.  The peroneal nerve dysfunction order is most likely artifactual as it does not fit with exam.  Blood work has been noncontributory for neuromuscular disease.  We will check an MRI of the cervical spine since she does have slight hyperreflexia and complains of muscle cramps.  Did try to find outside imaging done at [facility] and it appears that she has not had an MRI of the cervical spine.    Could consider getting a second opinion from rheumatology if neurological work-up is negative.  She is already on Cymbalta.  She is tried gabapentin in the past and wants to try it again though since we are increasing the nortriptyline we will hold off on prescribing gabapentin.    I can see her back in 2 to 3 months.    -     nortriptyline (PAMELOR) 10 MG capsule; Take 3 cap/night and then increase by 1 cap/week to a max of 4 caps/night as needed and tolerated.  -     MR Cervical Spine w/o Contrast; Future    Return in  about 2 months (around 11/20/2022) for In-Clinic Visit (must), After testing.    Over 42 minutes were spent coordinating the care for the patient on the day of the encounter.  This includes previsit, during visit and post visit activities as documented above.  Counseling patient.  Multiple problems reviewed/addressed.  Prescription management.  Testing ordered.  Testing reviewed.  Trying to find outside records from [facility]  (Activities include but not inclusive of reviewing chart, reviewing outside records, reviewing labs and imaging study results as well as the images, patient visit time including getting history and exam,  use if applicable, review of test results with the patient and coming up with a plan in a shared model, counseling patient and family, education and answering patient questions, EMR , EMR diagnosis entry and problem list management, medication reconciliation and prescription management if applicable, paperwork if applicable, printing documents and documentation of the visit activities.)    Gen Aceves MD  Neurologist  [facility] Neurology [facility]  Tel:- [phone]    This note was dictated using voice recognition software.  Any grammatical or context distortions are unintentional and inherent to the software.        Again, thank you for allowing me to participate in the care of your patient.        Sincerely,        Gen Aceves MD

## 2022-09-20 NOTE — NURSING NOTE
Chief Complaint   Patient presents with     Follow Up     EMG results     Emperatriz Pate MA,CMA,11:25 AM

## 2022-09-20 NOTE — PROGRESS NOTES
NEUROLOGY OUTPATIENT PROGRESS NOTE   Sep 20, 2022     CHIEF COMPLAINT/REASON FOR VISIT/REASON FOR CONSULT  Patient presents with:  Follow Up: EMG results     REASON FOR CONSULTATION- Headaches and body pain     REFERRAL SOURCE  Dr. Dallin Loredo   Dr. Dallin Loredo    HISTORY OF PRESENT ILLNESS  Mahogany Leal is a 47 year old female seen today for evaluation of headaches and body pain.  All her symptoms started 15 years ago.  There was no provoking factor 15 years ago.    1.  Headaches these are almost every day.  They can be more in the back of the head but sometimes radiate to the front.  They are more of a pressure with no significant photophobia or phonophobia.  She occasionally does get visual auras.  They can be all day long.  They do go away when she goes to sleep.  Denies any other provoking factors.  Has not tried any medications.  Does use Tylenol for abortive therapy.  She does use Tylenol multiple times a day.  She is also using it for dental bridging pain.  There is question of TMJ also.  Has had a MRI 15 years ago that was negative.    2.  She reports generalized arm and leg pain.  This is more of a dull bruising pain though occasionally can be sharp.  Activity does make it worse.  There is no associated numbness or weakness though reports that sometimes her arms feel heavy.  She does complain of some neck pain and low back pain.  She has had some MRIs of her low back done through [facility].  Recently had an x-ray of her neck done through [facility] both of these were noncontributory (per patient report).  Was seen by her rheumatologist 15 years ago and was thought to have fibromyalgia versus connective tissue disease.  Sitting for too long as well because cramping in the back of her legs.    9/20/22  Patient returns today  1.  Headaches have slowly gotten little bit better with the nortriptyline.  She is only given 20 mg of nortriptyline.  Sleeping better at night with the  nortriptyline.  No side effects.  Has not gotten beyond the 20 mg as she is also on Cymbalta.  Headaches are still every day but less severe compared to before.  Tylenol generally works for abortive therapy.  He is not taking any prescription medications for headaches  2.  Continues to have the muscle pain all over.  Has not had an MRI done through [facility] and would like to schedule one of her neck.  Discussed about possibility of fibromyalgia.  She has tried gabapentin in the past but wants to give another try.  Cymbalta does not help with the muscle pain.  No other new concerns today.      Previous history is reviewed and this is unchanged.    PAST MEDICAL/SURGICAL HISTORY  Past Medical History:   Diagnosis Date     Anxiety      Chronic headaches      Costochondritis      Genital herpes simplex type 1 infection      Mononucleosis      Recurrent UTI      Patient Active Problem List   Diagnosis     Abnormal uterine bleeding     Anxiety     Genital herpes simplex     Acute bronchitis     Uterine leiomyoma   Significant for frequent UTIs    FAMILY HISTORY  Family History   Problem Relation Age of Onset     Cancer Mother         Soft Palate     Aneurysm Mother         Brain     Nephrolithiasis Father      Colon Cancer Father 58.00     Colon Cancer Maternal Grandmother 65.00     Diabetes Maternal Grandfather      Cerebrovascular Disease Maternal Grandfather    Family history positive for rheumatoid arthritis in her mother.  No family history of headaches.    SOCIAL HISTORY  Social History     Tobacco Use     Smoking status: Never Smoker     Smokeless tobacco: Never Used   Substance Use Topics     Alcohol use: Not Currently     Comment: Alcoholic Drinks/day: rare     Drug use: No       SYSTEMS REVIEW  Twelve-system ROS was done and other than the HPI this was negative except for some back pain and arm and leg pain and sometimes neck pain, bladder symptoms, headaches, anxiety.  No sleep issues.  No new concerns  reported today.    MEDICATIONS  DULoxetine (CYMBALTA) 30 MG capsule, every 24 hours  DULoxetine (CYMBALTA) 60 MG capsule, Take 60 mg by mouth  Meloxicam 10 MG CAPS, 1 tablet daily  trimethoprim (TRIMPEX) 100 MG tablet, Take 100 mg by mouth  VALACYCLOVIR HCL PO,     No current facility-administered medications on file prior to visit.       PHYSICAL EXAMINATION  VITALS: /87   Pulse 72   Resp 16   Wt 88 kg (194 lb)   BMI 30.38 kg/m    GENERAL: Healthy appearing, alert, no acute distress, normal habitus.  CARDIOVASCULAR: Extremities warm and well perfused. Pulses present.   NEUROLOGICAL:  Patient is awake and oriented to self, place and time.  Attention span is normal.  Memory is grossly intact.  Language is fluent and follows commands appropriately.  Appropriate fund of knowledge. Cranial nerves 2-12 are intact. There is no pronator drift.  Motor exam shows 5/5 strength in all extremities.  Tone is symmetric bilaterally in upper and lower extremities.  (Previously reflexes are symmetric and 2+ in upper extremities and 2+ brisk in lower extremities. Sensory exam is grossly intact to light touch, pin prick and vibration.)  Finger to nose and heel to shin is without dysmetria.  Romberg is negative.  Gait is normal and the patient is able to do tandem walk and walk on toes and heels.  Exam similar to before.    DIAGNOSTICS  OUTSIDE RECORDS  Outside referral notes reviewed.    MRI brain per patient report negative    LABS  Component      Latest Ref Rng & Units 7/8/2022   Sodium      136 - 145 mmol/L 139   Potassium      3.5 - 5.0 mmol/L 4.0   Chloride      98 - 107 mmol/L 107   Carbon Dioxide      22 - 31 mmol/L 27   Anion Gap      5 - 18 mmol/L 5   Urea Nitrogen      8 - 22 mg/dL 15   Creatinine      0.60 - 1.10 mg/dL 0.75   Calcium      8.5 - 10.5 mg/dL 8.8   Glucose      70 - 125 mg/dL 89   Alkaline Phosphatase      45 - 120 U/L 47   AST      0 - 40 U/L 18   ALT      0 - 45 U/L 17   Protein Total      6.0 - 8.0  g/dL 7.1   Albumin      3.5 - 5.0 g/dL 3.7   Bilirubin Total      0.0 - 1.0 mg/dL 0.4   GFR Estimate      >60 mL/min/1.73m2 >90   Vitamin B12      232 - 1,245 pg/mL 411   TSH      0.30 - 5.00 uIU/mL 2.76   Vitamin B1 Whole Blood Level      70 - 180 nmol/L 73   Magnesium      1.8 - 2.6 mg/dL 2.1   CK Total      30 - 190 U/L 37   MIRNA interpretation      Negative Negative   Ferritin      6 - 175 ng/mL 131   Hemoglobin A1C      <=5.6 % 5.1     Component      Latest Ref Rng & Units 7/8/2022   Albumin Fraction      3.7 - 5.1 g/dL 4.1   Alpha 1 Fraction      0.2 - 0.4 g/dL 0.3   Alpha 2 Fraction      0.5 - 0.9 g/dL 0.6   Beta Fraction      0.6 - 1.0 g/dL 0.8   Gamma Fraction      0.7 - 1.6 g/dL 1.1   Monoclonal Peak      <=0.0 g/dL 0.0   ELP Interpretation:       Essentially normal electrophoretic pattern. No obvious monoclonal proteins seen. Pathologic significance requires clinical correlation. Alex Del Real M.D., Ph.D., Pathologist.   Immunofixation ELP       No monoclonal protein seen on immunofixation. Pathologic significance requires clinical correlation. Alex Del Real M.D., Ph.D., Pathologist   Total Protein Serum for ELP      6.4 - 8.3 g/dL 6.9     EMG  CLINICAL INTERPRETATION:  This is a normal nerve conduction and EMG study.  The low normal amplitude of the right peroneal nerve is of unclear significance and most likely artifactual.  Further clinical correlation is needed.         IMPRESSION/REPORT/PLAN  Intractable chronic migraine without aura and without status migrainosus  Muscle pain  Question of medication overuse headaches  Rule out fibromyalgia    This is a 47 year old female with headaches and generalized body pain    1.  Headaches:-This is suggestive of chronic migraines based on description.  She reportedly had an MRI of the brain 15 years ago that was negative.  There might be a component of medication overuse headaches since she has been using abortive medication multiple times a day.   She is also using the Tylenol for some dental issues.  She does have some TMJ issues as well which could be causing the headaches.  Currently with the use of nortriptyline headaches have come less severe though still every day.  She is currently on the 20 mg dose.  Would encourage her to further go up on the nortriptyline to see if that further helps.  Can continue Tylenol for abortive therapy though not overusing it all the time.  Could consider repeating the MRI if headaches are refractory to medication.    2.  Her generalized body pain would be nonneurological and possibly related to fibromyalgia.  She does complain of some muscle cramping with activity and would like to rule out neuromuscular disease.  EMG was noncontributory.  The peroneal nerve dysfunction order is most likely artifactual as it does not fit with exam.  Blood work has been noncontributory for neuromuscular disease.  We will check an MRI of the cervical spine since she does have slight hyperreflexia and complains of muscle cramps.  Did try to find outside imaging done at [facility] and it appears that she has not had an MRI of the cervical spine.    Could consider getting a second opinion from rheumatology if neurological work-up is negative.  She is already on Cymbalta.  She is tried gabapentin in the past and wants to try it again though since we are increasing the nortriptyline we will hold off on prescribing gabapentin.    I can see her back in 2 to 3 months.    -     nortriptyline (PAMELOR) 10 MG capsule; Take 3 cap/night and then increase by 1 cap/week to a max of 4 caps/night as needed and tolerated.  -     MR Cervical Spine w/o Contrast; Future    Return in about 2 months (around 11/20/2022) for In-Clinic Visit (must), After testing.    Over 42 minutes were spent coordinating the care for the patient on the day of the encounter.  This includes previsit, during visit and post visit activities as documented above.  Counseling  patient.  Multiple problems reviewed/addressed.  Prescription management.  Testing ordered.  Testing reviewed.  Trying to find outside records from [facility]  (Activities include but not inclusive of reviewing chart, reviewing outside records, reviewing labs and imaging study results as well as the images, patient visit time including getting history and exam,  use if applicable, review of test results with the patient and coming up with a plan in a shared model, counseling patient and family, education and answering patient questions, EMR , EMR diagnosis entry and problem list management, medication reconciliation and prescription management if applicable, paperwork if applicable, printing documents and documentation of the visit activities.)    Gen Aceves MD  Neurologist  [facility] Neurology [facility]  Tel:- [phone]    This note was dictated using voice recognition software.  Any grammatical or context distortions are unintentional and inherent to the software.

## 2022-09-20 NOTE — LETTER
9/20/2022         RE: Mahogany Leal  [address]  [facility] [address]        Dear Colleague,    Thank you for referring your patient, Mahogany Leal, to the [facility] NEUROLOGY CLINIC [place]. Please see a copy of my visit note below.    See procedure note.       Again, thank you for allowing me to participate in the care of your patient.        Sincerely,        Gen Aceves MD

## 2022-09-29 ENCOUNTER — HOSPITAL ENCOUNTER (OUTPATIENT)
Dept: MRI IMAGING | Facility: HOSPITAL | Age: 47
Discharge: HOME | End: 2022-09-29
Attending: PSYCHIATRY & NEUROLOGY | Admitting: PSYCHIATRY & NEUROLOGY
Payer: COMMERCIAL

## 2022-09-29 DIAGNOSIS — M79.10 MUSCLE PAIN: ICD-10-CM

## 2022-09-29 PROCEDURE — 72141 MRI NECK SPINE W/O DYE: CPT

## 2022-12-01 ENCOUNTER — LAB REQUISITION (OUTPATIENT)
Dept: LAB | Facility: CLINIC | Age: 47
End: 2022-12-01
Payer: COMMERCIAL

## 2022-12-01 DIAGNOSIS — R51.9 HEADACHE, UNSPECIFIED: ICD-10-CM

## 2022-12-01 LAB
CRP SERPL HS-MCNC: 5.97 MG/L
ERYTHROCYTE [SEDIMENTATION RATE] IN BLOOD BY WESTERGREN METHOD: 28 MM/HR (ref 0–20)

## 2022-12-01 PROCEDURE — 85652 RBC SED RATE AUTOMATED: CPT | Mod: ORL | Performed by: PHYSICIAN ASSISTANT

## 2022-12-01 PROCEDURE — 86141 C-REACTIVE PROTEIN HS: CPT | Mod: ORL | Performed by: PHYSICIAN ASSISTANT

## 2022-12-20 ENCOUNTER — OFFICE VISIT (OUTPATIENT)
Dept: NEUROLOGY | Facility: CLINIC | Age: 47
End: 2022-12-20
Payer: COMMERCIAL

## 2022-12-20 VITALS
WEIGHT: 194 LBS | HEIGHT: 67 IN | DIASTOLIC BLOOD PRESSURE: 88 MMHG | BODY MASS INDEX: 30.45 KG/M2 | HEART RATE: 86 BPM | SYSTOLIC BLOOD PRESSURE: 135 MMHG

## 2022-12-20 DIAGNOSIS — M79.10 MUSCLE PAIN: ICD-10-CM

## 2022-12-20 DIAGNOSIS — M31.6 TEMPORAL ARTERITIS (H): ICD-10-CM

## 2022-12-20 DIAGNOSIS — M79.7 FIBROMYALGIA: ICD-10-CM

## 2022-12-20 DIAGNOSIS — R51.9 NONINTRACTABLE HEADACHE, UNSPECIFIED CHRONICITY PATTERN, UNSPECIFIED HEADACHE TYPE: ICD-10-CM

## 2022-12-20 DIAGNOSIS — G43.719 INTRACTABLE CHRONIC MIGRAINE WITHOUT AURA AND WITHOUT STATUS MIGRAINOSUS: Primary | ICD-10-CM

## 2022-12-20 PROCEDURE — 99215 OFFICE O/P EST HI 40 MIN: CPT | Performed by: PSYCHIATRY & NEUROLOGY

## 2022-12-20 RX ORDER — PREDNISONE 20 MG/1
TABLET ORAL
COMMUNITY
Start: 2022-12-16

## 2022-12-20 NOTE — NURSING NOTE
Chief Complaint   Patient presents with     Headache     Xiao PEREZ Cardona on 12/20/2022 at 1:16 PM

## 2022-12-20 NOTE — LETTER
12/20/2022         RE: Mahogany Leal  [address]  [facility] [place] [address]        Dear Colleague,    Thank you for referring your patient, Mahogany Leal, to the [facility] [place]. Please see a copy of my visit note below.    NEUROLOGY OUTPATIENT PROGRESS NOTE   Dec 20, 2022     CHIEF COMPLAINT/REASON FOR VISIT/REASON FOR CONSULT  Patient presents with:  Headache    REASON FOR CONSULTATION- Headaches and body pain     REFERRAL SOURCE  Dr. Dallin Loredo  CC Dr. Dallin Loredo    HISTORY OF PRESENT ILLNESS  Mahogany Leal is a 47 year old female seen today for evaluation of headaches and body pain.  All her symptoms started 15 years ago.  There was no provoking factor 15 years ago.    1.  Headaches these are almost every day.  They can be more in the back of the head but sometimes radiate to the front.  They are more of a pressure with no significant photophobia or phonophobia.  She occasionally does get visual auras.  They can be all day long.  They do go away when she goes to sleep.  Denies any other provoking factors.  Has not tried any medications.  Does use Tylenol for abortive therapy.  She does use Tylenol multiple times a day.  She is also using it for dental bridging pain.  There is question of TMJ also.  Has had a MRI 15 years ago that was negative.    2.  She reports generalized arm and leg pain.  This is more of a dull bruising pain though occasionally can be sharp.  Activity does make it worse.  There is no associated numbness or weakness though reports that sometimes her arms feel heavy.  She does complain of some neck pain and low back pain.  She has had some MRIs of her low back done through [facility].  Recently had an x-ray of her neck done through [facility] both of these were noncontributory (per patient report).  Was seen by her rheumatologist 15 years ago and was thought to have fibromyalgia versus connective tissue disease.  Sitting for too  long as well because cramping in the back of her legs.    9/20/22  Patient returns today  1.  Headaches have slowly gotten little bit better with the nortriptyline.  She is only given 20 mg of nortriptyline.  Sleeping better at night with the nortriptyline.  No side effects.  Has not gotten beyond the 20 mg as she is also on Cymbalta.  Headaches are still every day but less severe compared to before.  Tylenol generally works for abortive therapy.  He is not taking any prescription medications for headaches  2.  Continues to have the muscle pain all over.  Has not had an MRI done through [facility] and would like to schedule one of her neck.  Discussed about possibility of fibromyalgia.  She has tried gabapentin in the past but wants to give another try.  Cymbalta does not help with the muscle pain.  No other new concerns today.    12/20/22  Patient returns today  1.  Headaches are similar.  They have become less severe though still every day.  They are still in the left temple.  Nortriptyline does help but nothing significantly.  She is only taking the 30 mg of nortriptyline.  I will increase it to 40 mg.  The nortriptyline does help her fall asleep.  2.  About a month ago she was palpating her left temple.  She noticed a bump.  She was seen by her primary care provider.  This was thought to be possible temporal arteritis.  An ESR/CRP was checked.  This came back elevated.  Patient was recommended to follow-up with neurology.  She was put on 10 days of steroids which she is currently taking.  No longer notices the problem for the pain.  This is different than her headaches.  She has not had any vision loss.  Does complain of some difficulty with focusing which we discussed may be more due to refractory changes with aging.  Has had some static television like symptoms which we discussed could be related to the migraines.    3.  Please to have generalized body pain.  Remains on Cymbalta.  Has not seen  rheumatology.    Previous history is reviewed and this is unchanged.    PAST MEDICAL/SURGICAL HISTORY  Past Medical History:   Diagnosis Date     Anxiety      Chronic headaches      Costochondritis      Genital herpes simplex type 1 infection      Mononucleosis      Recurrent UTI      Patient Active Problem List   Diagnosis     Abnormal uterine bleeding     Anxiety     Genital herpes simplex     Acute bronchitis     Uterine leiomyoma   Significant for frequent UTIs    FAMILY HISTORY  Family History   Problem Relation Age of Onset     Cancer Mother         Soft Palate     Aneurysm Mother         Brain     Nephrolithiasis Father      Colon Cancer Father 58.00     Colon Cancer Maternal Grandmother 65.00     Diabetes Maternal Grandfather      Cerebrovascular Disease Maternal Grandfather    Family history positive for rheumatoid arthritis in her mother.  No family history of headaches.    SOCIAL HISTORY  Social History     Tobacco Use     Smoking status: Never     Smokeless tobacco: Never   Substance Use Topics     Alcohol use: Not Currently     Comment: Alcoholic Drinks/day: rare     Drug use: No       SYSTEMS REVIEW  Twelve-system ROS was done and other than the HPI this was negative except for some back pain and arm and leg pain and sometimes neck pain, bladder symptoms, headaches, anxiety.  No sleep issues.  No new symptoms other than the HPI.    MEDICATIONS  DULoxetine (CYMBALTA) 30 MG capsule, every 24 hours  DULoxetine (CYMBALTA) 60 MG capsule, Take 60 mg by mouth  Meloxicam 10 MG CAPS, 1 tablet daily  nortriptyline (PAMELOR) 10 MG capsule, Take 3 cap/night and then increase by 1 cap/week to a max of 4 caps/night as needed and tolerated.  predniSONE (DELTASONE) 20 MG tablet, TAKE 2 TABLETS FOR 5 DAYS, THEN 1 TABLET FOR 5 DAYS  trimethoprim (TRIMPEX) 100 MG tablet, Take 100 mg by mouth  VALACYCLOVIR HCL PO,     No current facility-administered medications on file prior to visit.       PHYSICAL EXAMINATION  VITALS:  "/88   Pulse 86   Ht 1.702 m (5' 7\")   Wt 88 kg (194 lb)   BMI 30.38 kg/m    GENERAL: Healthy appearing, alert, no acute distress, normal habitus.  CARDIOVASCULAR: Extremities warm and well perfused. Pulses present.   NEUROLOGICAL:  Patient is awake and oriented to self, place and time.  Attention span is normal.  Memory is grossly intact.  Language is fluent and follows commands appropriately.  Appropriate fund of knowledge. Cranial nerves 2-12 are intact. There is no pronator drift.  Motor exam shows 5/5 strength in all extremities.  Tone is symmetric bilaterally in upper and lower extremities.  (Previously reflexes are symmetric and 2+ in upper extremities and 2+ brisk in lower extremities. Sensory exam is grossly intact to light touch, pin prick and vibration.)  Finger to nose and heel to shin is without dysmetria.  Romberg is negative.  Gait is normal and the patient is able to do tandem walk and walk on toes and heels.  Exam unchanged compared to before.  Palpation of the scalp does not show any tenderness.    DIAGNOSTICS  OUTSIDE RECORDS  Outside referral notes reviewed.    MRI brain per patient report negative    LABS  Component      Latest Ref Rng & Units 7/8/2022   Sodium      136 - 145 mmol/L 139   Potassium      3.5 - 5.0 mmol/L 4.0   Chloride      98 - 107 mmol/L 107   Carbon Dioxide      22 - 31 mmol/L 27   Anion Gap      5 - 18 mmol/L 5   Urea Nitrogen      8 - 22 mg/dL 15   Creatinine      0.60 - 1.10 mg/dL 0.75   Calcium      8.5 - 10.5 mg/dL 8.8   Glucose      70 - 125 mg/dL 89   Alkaline Phosphatase      45 - 120 U/L 47   AST      0 - 40 U/L 18   ALT      0 - 45 U/L 17   Protein Total      6.0 - 8.0 g/dL 7.1   Albumin      3.5 - 5.0 g/dL 3.7   Bilirubin Total      0.0 - 1.0 mg/dL 0.4   GFR Estimate      >60 mL/min/1.73m2 >90   Vitamin B12      232 - 1,245 pg/mL 411   TSH      0.30 - 5.00 uIU/mL 2.76   Vitamin B1 Whole Blood Level      70 - 180 nmol/L 73   Magnesium      1.8 - 2.6 mg/dL " 2.1   CK Total      30 - 190 U/L 37   MIRNA interpretation      Negative Negative   Ferritin      6 - 175 ng/mL 131   Hemoglobin A1C      <=5.6 % 5.1     Component      Latest Ref Rng & Units 7/8/2022   Albumin Fraction      3.7 - 5.1 g/dL 4.1   Alpha 1 Fraction      0.2 - 0.4 g/dL 0.3   Alpha 2 Fraction      0.5 - 0.9 g/dL 0.6   Beta Fraction      0.6 - 1.0 g/dL 0.8   Gamma Fraction      0.7 - 1.6 g/dL 1.1   Monoclonal Peak      <=0.0 g/dL 0.0   ELP Interpretation:       Essentially normal electrophoretic pattern. No obvious monoclonal proteins seen. Pathologic significance requires clinical correlation. Alex Del Real M.D., Ph.D., Pathologist.   Immunofixation ELP       No monoclonal protein seen on immunofixation. Pathologic significance requires clinical correlation. Alex Del Real M.D., Ph.D., Pathologist   Total Protein Serum for ELP      6.4 - 8.3 g/dL 6.9     EMG  CLINICAL INTERPRETATION:  This is a normal nerve conduction and EMG study.  The low normal amplitude of the right peroneal nerve is of unclear significance and most likely artifactual.  Further clinical correlation is needed.     MRI C spine  IMPRESSION:   1.  Mild multilevel spondylosis described above.  2.  No critical thecal sac stenosis.  3.  No severe high-grade neural foraminal stenosis.  4.  No abnormal cord signal.      Component      Latest Ref Rng & Units 12/1/2022   C-Reactive Protein High Sensitivity       5.97   Sed Rate      0 - 20 mm/hr 28 (H)   Primary care notes reviewed.    MRI C spine- images reviewed  IMPRESSION:   1.  Mild multilevel spondylosis described above.  2.  No critical thecal sac stenosis.  3.  No severe high-grade neural foraminal stenosis.  4.  No abnormal cord signal.    IMPRESSION/REPORT/PLAN  Intractable chronic migraine without aura and without status migrainosus  Muscle pain  Question of medication overuse headaches  Rule out fibromyalgia  Elevated ESR/CRP  Rule out temporal arteritis    This is a 47  year old female with headaches and generalized body pain    1.  Headaches:-This is suggestive of chronic migraines based on description.  She reportedly had an MRI of the brain 15 years ago that was negative.  Currently is on nortriptyline with.  With using the nortriptyline she does not have to take the Tylenol frequently.  Would encourage her to go up on the nortriptyline.  If this does not work we will use Topamax.  We will check an MRI of the brain.  There is a family history of aneurysms and we will check an MRA as well.  Headaches seem refractory to medications.    2.  With left scalp pain and the elevated ESR/CRP we will order a temporal artery biopsy.  She is currently on steroids which can affect the results of the temporal artery biopsy.  Overall her symptoms are not very suggestive of temporal arteritis.  We will encourage her to follow-up with her eye doctor as well.    2.  Her generalized body pain would be nonneurological and possibly related to fibromyalgia.  She does complain of some muscle cramping with activity and would like to rule out neuromuscular disease.  EMG was noncontributory.  The peroneal nerve dysfunction order is most likely artifactual as it does not fit with exam.  Blood work has been noncontributory for neuromuscular disease.  We will check an MRI of the cervical spine since she does have slight hyperreflexia and complains of muscle cramps.  MRI cervical spine was noncontributory.  Could consider getting a second opinion from rheumatology if neurological work-up is negative.  She is already on Cymbalta.  She is tried gabapentin in the past and wants to try it again though since we are increasing the nortriptyline we will hold off on prescribing gabapentin.  No change for right now.    Return in 1 month after testing    -     nortriptyline (PAMELOR) 10 MG capsule; Take 3 cap/night and then increase by 1 cap/week to a max of 4 caps/night as needed and tolerated.  -     MR Brain w/o &  w Contrast; Future  -     MRA Head w/o Contrast Angiogram; Future  -     Adult General Surg Referral; Future- left temporal artery biopsy    Return in about 1 month (around 1/20/2023) for In-Clinic Visit (must), After testing.    Over 45 minutes were spent coordinating the care for the patient on the day of the encounter.  This includes previsit, during visit and post visit activities as documented above.  Counseling patient.  MRI reviewed.  Multiple test ordered.  New problem.  Reviewing primary care notes.  (Activities include but not inclusive of reviewing chart, reviewing outside records, reviewing labs and imaging study results as well as the images, patient visit time including getting history and exam,  use if applicable, review of test results with the patient and coming up with a plan in a shared model, counseling patient and family, education and answering patient questions, EMR , EMR diagnosis entry and problem list management, medication reconciliation and prescription management if applicable, paperwork if applicable, printing documents and documentation of the visit activities.)    Gen Aceves MD  Neurologist  [facility]  Tel:- [phone]    This note was dictated using voice recognition software.  Any grammatical or context distortions are unintentional and inherent to the software.        Again, thank you for allowing me to participate in the care of your patient.        Sincerely,        Gen Aceves MD

## 2022-12-20 NOTE — PROGRESS NOTES
NEUROLOGY OUTPATIENT PROGRESS NOTE   Dec 20, 2022     CHIEF COMPLAINT/REASON FOR VISIT/REASON FOR CONSULT  Patient presents with:  Headache    REASON FOR CONSULTATION- Headaches and body pain     REFERRAL SOURCE  Dr. Dallin Loredo  CC Dr. Dallin Loredo    HISTORY OF PRESENT ILLNESS  Mahogany Leal is a 47 year old female seen today for evaluation of headaches and body pain.  All her symptoms started 15 years ago.  There was no provoking factor 15 years ago.    1.  Headaches these are almost every day.  They can be more in the back of the head but sometimes radiate to the front.  They are more of a pressure with no significant photophobia or phonophobia.  She occasionally does get visual auras.  They can be all day long.  They do go away when she goes to sleep.  Denies any other provoking factors.  Has not tried any medications.  Does use Tylenol for abortive therapy.  She does use Tylenol multiple times a day.  She is also using it for dental bridging pain.  There is question of TMJ also.  Has had a MRI 15 years ago that was negative.    2.  She reports generalized arm and leg pain.  This is more of a dull bruising pain though occasionally can be sharp.  Activity does make it worse.  There is no associated numbness or weakness though reports that sometimes her arms feel heavy.  She does complain of some neck pain and low back pain.  She has had some MRIs of her low back done through [facility].  Recently had an x-ray of her neck done through [facility] both of these were noncontributory (per patient report).  Was seen by her rheumatologist 15 years ago and was thought to have fibromyalgia versus connective tissue disease.  Sitting for too long as well because cramping in the back of her legs.    9/20/22  Patient returns today  1.  Headaches have slowly gotten little bit better with the nortriptyline.  She is only given 20 mg of nortriptyline.  Sleeping better at night with the nortriptyline.   No side effects.  Has not gotten beyond the 20 mg as she is also on Cymbalta.  Headaches are still every day but less severe compared to before.  Tylenol generally works for abortive therapy.  He is not taking any prescription medications for headaches  2.  Continues to have the muscle pain all over.  Has not had an MRI done through [facility] and would like to schedule one of her neck.  Discussed about possibility of fibromyalgia.  She has tried gabapentin in the past but wants to give another try.  Cymbalta does not help with the muscle pain.  No other new concerns today.    12/20/22  Patient returns today  1.  Headaches are similar.  They have become less severe though still every day.  They are still in the left temple.  Nortriptyline does help but nothing significantly.  She is only taking the 30 mg of nortriptyline.  I will increase it to 40 mg.  The nortriptyline does help her fall asleep.  2.  About a month ago she was palpating her left temple.  She noticed a bump.  She was seen by her primary care provider.  This was thought to be possible temporal arteritis.  An ESR/CRP was checked.  This came back elevated.  Patient was recommended to follow-up with neurology.  She was put on 10 days of steroids which she is currently taking.  No longer notices the problem for the pain.  This is different than her headaches.  She has not had any vision loss.  Does complain of some difficulty with focusing which we discussed may be more due to refractory changes with aging.  Has had some static television like symptoms which we discussed could be related to the migraines.    3.  Please to have generalized body pain.  Remains on Cymbalta.  Has not seen rheumatology.    Previous history is reviewed and this is unchanged.    PAST MEDICAL/SURGICAL HISTORY  Past Medical History:   Diagnosis Date     Anxiety      Chronic headaches      Costochondritis      Genital herpes simplex type 1 infection      Mononucleosis       "Recurrent UTI      Patient Active Problem List   Diagnosis     Abnormal uterine bleeding     Anxiety     Genital herpes simplex     Acute bronchitis     Uterine leiomyoma   Significant for frequent UTIs    FAMILY HISTORY  Family History   Problem Relation Age of Onset     Cancer Mother         Soft Palate     Aneurysm Mother         Brain     Nephrolithiasis Father      Colon Cancer Father 58.00     Colon Cancer Maternal Grandmother 65.00     Diabetes Maternal Grandfather      Cerebrovascular Disease Maternal Grandfather    Family history positive for rheumatoid arthritis in her mother.  No family history of headaches.    SOCIAL HISTORY  Social History     Tobacco Use     Smoking status: Never     Smokeless tobacco: Never   Substance Use Topics     Alcohol use: Not Currently     Comment: Alcoholic Drinks/day: rare     Drug use: No       SYSTEMS REVIEW  Twelve-system ROS was done and other than the HPI this was negative except for some back pain and arm and leg pain and sometimes neck pain, bladder symptoms, headaches, anxiety.  No sleep issues.  No new symptoms other than the HPI.    MEDICATIONS  DULoxetine (CYMBALTA) 30 MG capsule, every 24 hours  DULoxetine (CYMBALTA) 60 MG capsule, Take 60 mg by mouth  Meloxicam 10 MG CAPS, 1 tablet daily  nortriptyline (PAMELOR) 10 MG capsule, Take 3 cap/night and then increase by 1 cap/week to a max of 4 caps/night as needed and tolerated.  predniSONE (DELTASONE) 20 MG tablet, TAKE 2 TABLETS FOR 5 DAYS, THEN 1 TABLET FOR 5 DAYS  trimethoprim (TRIMPEX) 100 MG tablet, Take 100 mg by mouth  VALACYCLOVIR HCL PO,     No current facility-administered medications on file prior to visit.       PHYSICAL EXAMINATION  VITALS: /88   Pulse 86   Ht 1.702 m (5' 7\")   Wt 88 kg (194 lb)   BMI 30.38 kg/m    GENERAL: Healthy appearing, alert, no acute distress, normal habitus.  CARDIOVASCULAR: Extremities warm and well perfused. Pulses present.   NEUROLOGICAL:  Patient is awake and " oriented to self, place and time.  Attention span is normal.  Memory is grossly intact.  Language is fluent and follows commands appropriately.  Appropriate fund of knowledge. Cranial nerves 2-12 are intact. There is no pronator drift.  Motor exam shows 5/5 strength in all extremities.  Tone is symmetric bilaterally in upper and lower extremities.  (Previously reflexes are symmetric and 2+ in upper extremities and 2+ brisk in lower extremities. Sensory exam is grossly intact to light touch, pin prick and vibration.)  Finger to nose and heel to shin is without dysmetria.  Romberg is negative.  Gait is normal and the patient is able to do tandem walk and walk on toes and heels.  Exam unchanged compared to before.  Palpation of the scalp does not show any tenderness.    DIAGNOSTICS  OUTSIDE RECORDS  Outside referral notes reviewed.    MRI brain per patient report negative    LABS  Component      Latest Ref Rng & Units 7/8/2022   Sodium      136 - 145 mmol/L 139   Potassium      3.5 - 5.0 mmol/L 4.0   Chloride      98 - 107 mmol/L 107   Carbon Dioxide      22 - 31 mmol/L 27   Anion Gap      5 - 18 mmol/L 5   Urea Nitrogen      8 - 22 mg/dL 15   Creatinine      0.60 - 1.10 mg/dL 0.75   Calcium      8.5 - 10.5 mg/dL 8.8   Glucose      70 - 125 mg/dL 89   Alkaline Phosphatase      45 - 120 U/L 47   AST      0 - 40 U/L 18   ALT      0 - 45 U/L 17   Protein Total      6.0 - 8.0 g/dL 7.1   Albumin      3.5 - 5.0 g/dL 3.7   Bilirubin Total      0.0 - 1.0 mg/dL 0.4   GFR Estimate      >60 mL/min/1.73m2 >90   Vitamin B12      232 - 1,245 pg/mL 411   TSH      0.30 - 5.00 uIU/mL 2.76   Vitamin B1 Whole Blood Level      70 - 180 nmol/L 73   Magnesium      1.8 - 2.6 mg/dL 2.1   CK Total      30 - 190 U/L 37   MIRNA interpretation      Negative Negative   Ferritin      6 - 175 ng/mL 131   Hemoglobin A1C      <=5.6 % 5.1     Component      Latest Ref Rng & Units 7/8/2022   Albumin Fraction      3.7 - 5.1 g/dL 4.1   Alpha 1 Fraction       0.2 - 0.4 g/dL 0.3   Alpha 2 Fraction      0.5 - 0.9 g/dL 0.6   Beta Fraction      0.6 - 1.0 g/dL 0.8   Gamma Fraction      0.7 - 1.6 g/dL 1.1   Monoclonal Peak      <=0.0 g/dL 0.0   ELP Interpretation:       Essentially normal electrophoretic pattern. No obvious monoclonal proteins seen. Pathologic significance requires clinical correlation. Alex Del Real M.D., Ph.D., Pathologist.   Immunofixation ELP       No monoclonal protein seen on immunofixation. Pathologic significance requires clinical correlation. Alex Del Real M.D., Ph.D., Pathologist   Total Protein Serum for ELP      6.4 - 8.3 g/dL 6.9     EMG  CLINICAL INTERPRETATION:  This is a normal nerve conduction and EMG study.  The low normal amplitude of the right peroneal nerve is of unclear significance and most likely artifactual.  Further clinical correlation is needed.     MRI C spine  IMPRESSION:   1.  Mild multilevel spondylosis described above.  2.  No critical thecal sac stenosis.  3.  No severe high-grade neural foraminal stenosis.  4.  No abnormal cord signal.      Component      Latest Ref Rng & Units 12/1/2022   C-Reactive Protein High Sensitivity       5.97   Sed Rate      0 - 20 mm/hr 28 (H)   Primary care notes reviewed.    MRI C spine- images reviewed  IMPRESSION:   1.  Mild multilevel spondylosis described above.  2.  No critical thecal sac stenosis.  3.  No severe high-grade neural foraminal stenosis.  4.  No abnormal cord signal.    IMPRESSION/REPORT/PLAN  Intractable chronic migraine without aura and without status migrainosus  Muscle pain  Question of medication overuse headaches  Rule out fibromyalgia  Elevated ESR/CRP  Rule out temporal arteritis    This is a 47 year old female with headaches and generalized body pain    1.  Headaches:-This is suggestive of chronic migraines based on description.  She reportedly had an MRI of the brain 15 years ago that was negative.  Currently is on nortriptyline with.  With using the  nortriptyline she does not have to take the Tylenol frequently.  Would encourage her to go up on the nortriptyline.  If this does not work we will use Topamax.  We will check an MRI of the brain.  There is a family history of aneurysms and we will check an MRA as well.  Headaches seem refractory to medications.    2.  With left scalp pain and the elevated ESR/CRP we will order a temporal artery biopsy.  She is currently on steroids which can affect the results of the temporal artery biopsy.  Overall her symptoms are not very suggestive of temporal arteritis.  We will encourage her to follow-up with her eye doctor as well.    2.  Her generalized body pain would be nonneurological and possibly related to fibromyalgia.  She does complain of some muscle cramping with activity and would like to rule out neuromuscular disease.  EMG was noncontributory.  The peroneal nerve dysfunction order is most likely artifactual as it does not fit with exam.  Blood work has been noncontributory for neuromuscular disease.  We will check an MRI of the cervical spine since she does have slight hyperreflexia and complains of muscle cramps.  MRI cervical spine was noncontributory.  Could consider getting a second opinion from rheumatology if neurological work-up is negative.  She is already on Cymbalta.  She is tried gabapentin in the past and wants to try it again though since we are increasing the nortriptyline we will hold off on prescribing gabapentin.  No change for right now.    Return in 1 month after testing    -     nortriptyline (PAMELOR) 10 MG capsule; Take 3 cap/night and then increase by 1 cap/week to a max of 4 caps/night as needed and tolerated.  -     MR Brain w/o & w Contrast; Future  -     MRA Head w/o Contrast Angiogram; Future  -     Adult General Surg Referral; Future- left temporal artery biopsy    Return in about 1 month (around 1/20/2023) for In-Clinic Visit (must), After testing.    Over 45 minutes were spent  coordinating the care for the patient on the day of the encounter.  This includes previsit, during visit and post visit activities as documented above.  Counseling patient.  MRI reviewed.  Multiple test ordered.  New problem.  Reviewing primary care notes.  (Activities include but not inclusive of reviewing chart, reviewing outside records, reviewing labs and imaging study results as well as the images, patient visit time including getting history and exam,  use if applicable, review of test results with the patient and coming up with a plan in a shared model, counseling patient and family, education and answering patient questions, EMR , EMR diagnosis entry and problem list management, medication reconciliation and prescription management if applicable, paperwork if applicable, printing documents and documentation of the visit activities.)    Gen Aceves MD  Neurologist  [facility]  Tel:- [phone]    This note was dictated using voice recognition software.  Any grammatical or context distortions are unintentional and inherent to the software.

## 2022-12-30 ENCOUNTER — PREP FOR PROCEDURE (OUTPATIENT)
Dept: SURGERY | Facility: CLINIC | Age: 47
End: 2022-12-30

## 2022-12-30 DIAGNOSIS — R51.9 INTRACTABLE HEADACHE: Primary | ICD-10-CM

## 2022-12-31 ENCOUNTER — HOSPITAL ENCOUNTER (OUTPATIENT)
Dept: MRI IMAGING | Facility: HOSPITAL | Age: 47
Discharge: HOME | End: 2022-12-31
Attending: PSYCHIATRY & NEUROLOGY | Admitting: PSYCHIATRY & NEUROLOGY
Payer: COMMERCIAL

## 2022-12-31 DIAGNOSIS — G43.719 INTRACTABLE CHRONIC MIGRAINE WITHOUT AURA AND WITHOUT STATUS MIGRAINOSUS: ICD-10-CM

## 2022-12-31 PROCEDURE — 255N000002 HC RX 255 OP 636: Performed by: PSYCHIATRY & NEUROLOGY

## 2022-12-31 PROCEDURE — A9585 GADOBUTROL INJECTION: HCPCS | Performed by: PSYCHIATRY & NEUROLOGY

## 2022-12-31 PROCEDURE — 70544 MR ANGIOGRAPHY HEAD W/O DYE: CPT

## 2022-12-31 PROCEDURE — 70553 MRI BRAIN STEM W/O & W/DYE: CPT

## 2022-12-31 RX ORDER — GADOBUTROL 604.72 MG/ML
9 INJECTION INTRAVENOUS ONCE
Status: COMPLETED | OUTPATIENT
Start: 2022-12-31 | End: 2022-12-31

## 2022-12-31 RX ADMIN — GADOBUTROL 9 ML: 604.72 INJECTION INTRAVENOUS at 14:18

## 2023-01-03 ENCOUNTER — TELEPHONE (OUTPATIENT)
Dept: SURGERY | Facility: CLINIC | Age: 48
End: 2023-01-03

## 2023-01-20 ENCOUNTER — LAB REQUISITION (OUTPATIENT)
Dept: LAB | Facility: CLINIC | Age: 48
End: 2023-01-20
Payer: COMMERCIAL

## 2023-01-20 DIAGNOSIS — R30.0 DYSURIA: ICD-10-CM

## 2023-01-20 PROCEDURE — 87086 URINE CULTURE/COLONY COUNT: CPT | Mod: ORL | Performed by: PHYSICIAN ASSISTANT

## 2023-01-22 LAB — BACTERIA UR CULT: ABNORMAL

## 2023-01-26 ENCOUNTER — TELEPHONE (OUTPATIENT)
Dept: SURGERY | Facility: CLINIC | Age: 48
End: 2023-01-26
Payer: COMMERCIAL

## 2023-01-26 NOTE — TELEPHONE ENCOUNTER
Left third message for patient to schedule surgery.  [employer] message also sent to patient requesting they give us a call if they would like to schedule surgery at this time.

## 2023-02-24 ENCOUNTER — OFFICE VISIT (OUTPATIENT)
Dept: NEUROLOGY | Facility: CLINIC | Age: 48
End: 2023-02-24
Payer: COMMERCIAL

## 2023-02-24 VITALS
HEART RATE: 76 BPM | DIASTOLIC BLOOD PRESSURE: 76 MMHG | BODY MASS INDEX: 31.32 KG/M2 | SYSTOLIC BLOOD PRESSURE: 119 MMHG | WEIGHT: 200 LBS

## 2023-02-24 DIAGNOSIS — M79.7 FIBROMYALGIA: ICD-10-CM

## 2023-02-24 DIAGNOSIS — R20.9 COLD HANDS: ICD-10-CM

## 2023-02-24 DIAGNOSIS — M79.10 MUSCLE PAIN: ICD-10-CM

## 2023-02-24 DIAGNOSIS — G43.719 INTRACTABLE CHRONIC MIGRAINE WITHOUT AURA AND WITHOUT STATUS MIGRAINOSUS: Primary | ICD-10-CM

## 2023-02-24 PROCEDURE — 99215 OFFICE O/P EST HI 40 MIN: CPT | Performed by: PSYCHIATRY & NEUROLOGY

## 2023-02-24 RX ORDER — TOPIRAMATE 25 MG/1
TABLET, FILM COATED ORAL
Qty: 360 TABLET | Refills: 1 | Status: SHIPPED | OUTPATIENT
Start: 2023-02-24 | End: 2023-05-25

## 2023-02-24 RX ORDER — RIZATRIPTAN BENZOATE 10 MG/1
10 TABLET ORAL
Qty: 18 TABLET | Refills: 1 | Status: SHIPPED | OUTPATIENT
Start: 2023-02-24

## 2023-02-24 RX ORDER — NORTRIPTYLINE HCL 10 MG
CAPSULE ORAL
Qty: 360 CAPSULE | Refills: 1 | Status: SHIPPED | OUTPATIENT
Start: 2023-02-24 | End: 2023-07-31

## 2023-02-24 NOTE — LETTER
2/24/2023         RE: Mahogany Leal  [address]  [facility] [place] [address]        Dear Colleague,    Thank you for referring your patient, Mahogany Leal, to the [facility] [place]. Please see a copy of my visit note below.    NEUROLOGY OUTPATIENT PROGRESS NOTE   Feb 24, 2023     CHIEF COMPLAINT/REASON FOR VISIT/REASON FOR CONSULT  Patient presents with:  Headache: 2 mo follow-up   TE hand coldness; noticed about a month and half ago    REASON FOR CONSULTATION- Headaches and body pain     REFERRAL SOURCE  Dr. Dallin Loredo  CC Dr. Dallin Loredo    HISTORY OF PRESENT ILLNESS  Mahogany Leal is a 47 year old female seen today for evaluation of headaches and body pain.  All her symptoms started 15 years ago.  There was no provoking factor 15 years ago.    1.  Headaches these are almost every day.  They can be more in the back of the head but sometimes radiate to the front.  They are more of a pressure with no significant photophobia or phonophobia.  She occasionally does get visual auras.  They can be all day long.  They do go away when she goes to sleep.  Denies any other provoking factors.  Has not tried any medications.  Does use Tylenol for abortive therapy.  She does use Tylenol multiple times a day.  She is also using it for dental bridging pain.  There is question of TMJ also.  Has had a MRI 15 years ago that was negative.    2.  She reports generalized arm and leg pain.  This is more of a dull bruising pain though occasionally can be sharp.  Activity does make it worse.  There is no associated numbness or weakness though reports that sometimes her arms feel heavy.  She does complain of some neck pain and low back pain.  She has had some MRIs of her low back done through [facility].  Recently had an x-ray of her neck done through [facility] both of these were noncontributory (per patient report).  Was seen by her rheumatologist 15 years ago and was thought to  have fibromyalgia versus connective tissue disease.  Sitting for too long as well because cramping in the back of her legs.    9/20/22  Patient returns today  1.  Headaches have slowly gotten little bit better with the nortriptyline.  She is only given 20 mg of nortriptyline.  Sleeping better at night with the nortriptyline.  No side effects.  Has not gotten beyond the 20 mg as she is also on Cymbalta.  Headaches are still every day but less severe compared to before.  Tylenol generally works for abortive therapy.  He is not taking any prescription medications for headaches  2.  Continues to have the muscle pain all over.  Has not had an MRI done through [facility] and would like to schedule one of her neck.  Discussed about possibility of fibromyalgia.  She has tried gabapentin in the past but wants to give another try.  Cymbalta does not help with the muscle pain.  No other new concerns today.    12/20/22  Patient returns today  1.  Headaches are similar.  They have become less severe though still every day.  They are still in the left temple.  Nortriptyline does help but nothing significantly.  She is only taking the 30 mg of nortriptyline.  I will increase it to 40 mg.  The nortriptyline does help her fall asleep.  2.  About a month ago she was palpating her left temple.  She noticed a bump.  She was seen by her primary care provider.  This was thought to be possible temporal arteritis.  An ESR/CRP was checked.  This came back elevated.  Patient was recommended to follow-up with neurology.  She was put on 10 days of steroids which she is currently taking.  No longer notices the problem for the pain.  This is different than her headaches.  She has not had any vision loss.  Does complain of some difficulty with focusing which we discussed may be more due to refractory changes with aging.  Has had some static television like symptoms which we discussed could be related to the migraines.    3.  Please to  have generalized body pain.  Remains on Cymbalta.  Has not seen rheumatology.    2/24/23  Patient demonstrating  1.  Headaches are similar to before.  She is tried 40 mg of nortriptyline without any significant benefit though it is helping her sleep.  Headaches are almost every day.  Tylenol does work as abortive therapy but not all the time.  2.  The bump that she was feeling in her left temple has not resolved.  She has not done the biopsy because of cost issues.  3.  Continues to have generalized body pain.  Does complain of some cold hands.  Remains on Cymbalta.  Has not seen rheumatology and we discussed about seeing rheumatology again.  No other new issues.    Previous history is reviewed and this is unchanged.    PAST MEDICAL/SURGICAL HISTORY  Past Medical History:   Diagnosis Date     Anxiety      Chronic headaches      Costochondritis      Genital herpes simplex type 1 infection      Mononucleosis      Recurrent UTI      Patient Active Problem List   Diagnosis     Abnormal uterine bleeding     Anxiety     Genital herpes simplex     Acute bronchitis     Uterine leiomyoma   Significant for frequent UTIs    FAMILY HISTORY  Family History   Problem Relation Age of Onset     Cancer Mother         Soft Palate     Aneurysm Mother         Brain     Nephrolithiasis Father      Colon Cancer Father 58.00     Colon Cancer Maternal Grandmother 65.00     Diabetes Maternal Grandfather      Cerebrovascular Disease Maternal Grandfather    Family history positive for rheumatoid arthritis in her mother.  No family history of headaches.    SOCIAL HISTORY  Social History     Tobacco Use     Smoking status: Never     Smokeless tobacco: Never   Substance Use Topics     Alcohol use: Not Currently     Comment: Alcoholic Drinks/day: rare     Drug use: No       SYSTEMS REVIEW  Twelve-system ROS was done and other than the HPI this was negative except for some back pain and arm and leg pain and sometimes neck pain, bladder symptoms,  headaches, anxiety.  No sleep issues.  No new issues/concerns reported.    MEDICATIONS  DULoxetine (CYMBALTA) 30 MG capsule, every 24 hours  DULoxetine (CYMBALTA) 60 MG capsule, Take 60 mg by mouth  Meloxicam 10 MG CAPS, 1 tablet daily  predniSONE (DELTASONE) 20 MG tablet, TAKE 2 TABLETS FOR 5 DAYS, THEN 1 TABLET FOR 5 DAYS  trimethoprim (TRIMPEX) 100 MG tablet, Take 100 mg by mouth  VALACYCLOVIR HCL PO,     No current facility-administered medications on file prior to visit.       PHYSICAL EXAMINATION  VITALS: /76   Pulse 76   Wt 90.7 kg (200 lb)   BMI 31.32 kg/m    GENERAL: Healthy appearing, alert, no acute distress, normal habitus.  CARDIOVASCULAR: Extremities warm and well perfused. Pulses present.   NEUROLOGICAL:  Patient is awake and oriented to self, place and time.  Attention span is normal.  Memory is grossly intact.  Language is fluent and follows commands appropriately.  Appropriate fund of knowledge. Cranial nerves 2-12 are intact. There is no pronator drift.  Motor exam shows 5/5 strength in all extremities.  Tone is symmetric bilaterally in upper and lower extremities.  (Previously reflexes are symmetric and 2+ in upper extremities and 2+ brisk in lower extremities. Sensory exam is grossly intact to light touch, pin prick and vibration.)  Finger to nose and heel to shin is without dysmetria.  Romberg is negative.  Gait is normal and the patient is able to do tandem walk and walk on toes and heels.  Exam similar to before.  Palpation of the scalp does not show any tenderness.    DIAGNOSTICS  OUTSIDE RECORDS  Outside referral notes reviewed.    MRI brain per patient report negative    LABS  Component      Latest Ref Rng & Units 7/8/2022   Sodium      136 - 145 mmol/L 139   Potassium      3.5 - 5.0 mmol/L 4.0   Chloride      98 - 107 mmol/L 107   Carbon Dioxide      22 - 31 mmol/L 27   Anion Gap      5 - 18 mmol/L 5   Urea Nitrogen      8 - 22 mg/dL 15   Creatinine      0.60 - 1.10 mg/dL 0.75    Calcium      8.5 - 10.5 mg/dL 8.8   Glucose      70 - 125 mg/dL 89   Alkaline Phosphatase      45 - 120 U/L 47   AST      0 - 40 U/L 18   ALT      0 - 45 U/L 17   Protein Total      6.0 - 8.0 g/dL 7.1   Albumin      3.5 - 5.0 g/dL 3.7   Bilirubin Total      0.0 - 1.0 mg/dL 0.4   GFR Estimate      >60 mL/min/1.73m2 >90   Vitamin B12      232 - 1,245 pg/mL 411   TSH      0.30 - 5.00 uIU/mL 2.76   Vitamin B1 Whole Blood Level      70 - 180 nmol/L 73   Magnesium      1.8 - 2.6 mg/dL 2.1   CK Total      30 - 190 U/L 37   MIRNA interpretation      Negative Negative   Ferritin      6 - 175 ng/mL 131   Hemoglobin A1C      <=5.6 % 5.1     Component      Latest Ref Rng & Units 7/8/2022   Albumin Fraction      3.7 - 5.1 g/dL 4.1   Alpha 1 Fraction      0.2 - 0.4 g/dL 0.3   Alpha 2 Fraction      0.5 - 0.9 g/dL 0.6   Beta Fraction      0.6 - 1.0 g/dL 0.8   Gamma Fraction      0.7 - 1.6 g/dL 1.1   Monoclonal Peak      <=0.0 g/dL 0.0   ELP Interpretation:       Essentially normal electrophoretic pattern. No obvious monoclonal proteins seen. Pathologic significance requires clinical correlation. Alex Del Real M.D., Ph.D., Pathologist.   Immunofixation ELP       No monoclonal protein seen on immunofixation. Pathologic significance requires clinical correlation. Alex Del Real M.D., Ph.D., Pathologist   Total Protein Serum for ELP      6.4 - 8.3 g/dL 6.9     EMG  CLINICAL INTERPRETATION:  This is a normal nerve conduction and EMG study.  The low normal amplitude of the right peroneal nerve is of unclear significance and most likely artifactual.  Further clinical correlation is needed.     MRI C spine  IMPRESSION:   1.  Mild multilevel spondylosis described above.  2.  No critical thecal sac stenosis.  3.  No severe high-grade neural foraminal stenosis.  4.  No abnormal cord signal.      Component      Latest Ref Rng & Units 12/1/2022   C-Reactive Protein High Sensitivity       5.97   Sed Rate      0 - 20 mm/hr 28 (H)    Primary care notes reviewed.    MRI C spine- images reviewed  IMPRESSION:   1.  Mild multilevel spondylosis described above.  2.  No critical thecal sac stenosis.  3.  No severe high-grade neural foraminal stenosis.  4.  No abnormal cord signal.    MRA brain-images reviewed.  No structural lesions.  IMPRESSION:  1.  No significant stenosis/large vessel occlusion, saccular aneurysm or high flow vascular malformation.    MRI brain-images reviewed no structural lesions.  IMPRESSION:  1.  No acute intracranial process. Specifically, no acute/subacute infarct, mass, acute hemorrhage or abnormal extra-axial fluid collection.    IMPRESSION/REPORT/PLAN  Intractable chronic migraine without aura and without status migrainosus  Muscle pain  Question of medication overuse headaches  Rule out fibromyalgia  Elevated ESR/CRP  Rule out temporal arteritis  Cool hands    This is a 47 year old female with headaches and generalized body pain    1.  Headaches:-This is suggestive of chronic migraines based on description.  She reportedly had an MRI of the brain 15 years ago that was negative.  Repeat MRI/MRA have been stable.  There is some family history of aneurysms.  Nortriptyline so far has not provide a lot of benefit though is helping her sleep.  We will add Topamax to see if it works.  Tylenol does work as abortive therapy though will prescribe Maxalt that she can use if the Tylenol does not work.    2.  With left scalp pain and the elevated ESR/CRP temporal biopsy was ordered.  She was put on steroids by primary care provider.  Currently the symptoms have resolved.  Because of cost issues she wants to hold off on getting a biopsy.  An eye clinic appointment was recommended in the past as well.    2.  Her generalized body pain would be nonneurological and possibly related to fibromyalgia.  She does complain of some muscle cramping with activity and would like to rule out neuromuscular disease.  EMG was noncontributory.  The  peroneal nerve dysfunction order is most likely artifactual as it does not fit with exam.  Blood work has been noncontributory for neuromuscular disease.  We will check an MRI of the cervical spine since she does have slight hyperreflexia and complains of muscle cramps.  MRI cervical spine was noncontributory.  Could consider getting a second opinion from rheumatology if neurological work-up is negative.  She does complain of cold hands and they can evaluate for Raynaud's disease as well.  Will refer to rheumatology.  She is already on Cymbalta.  She is tried gabapentin in the past and wants to try it again though since we are increasing the nortriptyline we will hold off on prescribing gabapentin.  No change for right now.    Return in 2 to 3 months.    -     nortriptyline (PAMELOR) 10 MG capsule; 4 caps/night  -     rizatriptan (MAXALT) 10 MG tablet; Take 1 tablet (10 mg) by mouth at onset of headache for migraine May repeat in 2 hours.  -     Adult Rheumatology  Referral; Future  -     topiramate (TOPAMAX) 25 MG tablet; Start with 1 tab/night and increase by 1 tablet/week as needed and tolerated to a max of 4 tabs/night.      Return in about 12 weeks (around 5/19/2023) for In-Clinic Visit (must), After testing.    Over 40 minutes were spent coordinating the care for the patient on the day of the encounter.  This includes previsit, during visit and post visit activities as documented above.  Counseling patient.  Multiple MRI images reviewed.  Prescription management.  Multiple problems reviewed/addressed.  (Activities include but not inclusive of reviewing chart, reviewing outside records, reviewing labs and imaging study results as well as the images, patient visit time including getting history and exam,  use if applicable, review of test results with the patient and coming up with a plan in a shared model, counseling patient and family, education and answering patient questions, EMR order  entry, EMR diagnosis entry and problem list management, medication reconciliation and prescription management if applicable, paperwork if applicable, printing documents and documentation of the visit activities.)    Gen Aceves MD  Neurologist  [facility] Neurology [facility]  Tel:- [phone]    This note was dictated using voice recognition software.  Any grammatical or context distortions are unintentional and inherent to the software.        Again, thank you for allowing me to participate in the care of your patient.        Sincerely,        Gen Aceves MD

## 2023-02-24 NOTE — PROGRESS NOTES
NEUROLOGY OUTPATIENT PROGRESS NOTE   Feb 24, 2023     CHIEF COMPLAINT/REASON FOR VISIT/REASON FOR CONSULT  Patient presents with:  Headache: 2 mo follow-up   TE hand coldness; noticed about a month and half ago    REASON FOR CONSULTATION- Headaches and body pain     REFERRAL SOURCE  Dr. Dallin Loredo   Dr. Dallin Loredo    HISTORY OF PRESENT ILLNESS  Mahogany Leal is a 47 year old female seen today for evaluation of headaches and body pain.  All her symptoms started 15 years ago.  There was no provoking factor 15 years ago.    1.  Headaches these are almost every day.  They can be more in the back of the head but sometimes radiate to the front.  They are more of a pressure with no significant photophobia or phonophobia.  She occasionally does get visual auras.  They can be all day long.  They do go away when she goes to sleep.  Denies any other provoking factors.  Has not tried any medications.  Does use Tylenol for abortive therapy.  She does use Tylenol multiple times a day.  She is also using it for dental bridging pain.  There is question of TMJ also.  Has had a MRI 15 years ago that was negative.    2.  She reports generalized arm and leg pain.  This is more of a dull bruising pain though occasionally can be sharp.  Activity does make it worse.  There is no associated numbness or weakness though reports that sometimes her arms feel heavy.  She does complain of some neck pain and low back pain.  She has had some MRIs of her low back done through [facility].  Recently had an x-ray of her neck done through [facility] both of these were noncontributory (per patient report).  Was seen by her rheumatologist 15 years ago and was thought to have fibromyalgia versus connective tissue disease.  Sitting for too long as well because cramping in the back of her legs.    9/20/22  Patient returns today  1.  Headaches have slowly gotten little bit better with the nortriptyline.  She is only given 20  mg of nortriptyline.  Sleeping better at night with the nortriptyline.  No side effects.  Has not gotten beyond the 20 mg as she is also on Cymbalta.  Headaches are still every day but less severe compared to before.  Tylenol generally works for abortive therapy.  He is not taking any prescription medications for headaches  2.  Continues to have the muscle pain all over.  Has not had an MRI done through [facility] and would like to schedule one of her neck.  Discussed about possibility of fibromyalgia.  She has tried gabapentin in the past but wants to give another try.  Cymbalta does not help with the muscle pain.  No other new concerns today.    12/20/22  Patient returns today  1.  Headaches are similar.  They have become less severe though still every day.  They are still in the left temple.  Nortriptyline does help but nothing significantly.  She is only taking the 30 mg of nortriptyline.  I will increase it to 40 mg.  The nortriptyline does help her fall asleep.  2.  About a month ago she was palpating her left temple.  She noticed a bump.  She was seen by her primary care provider.  This was thought to be possible temporal arteritis.  An ESR/CRP was checked.  This came back elevated.  Patient was recommended to follow-up with neurology.  She was put on 10 days of steroids which she is currently taking.  No longer notices the problem for the pain.  This is different than her headaches.  She has not had any vision loss.  Does complain of some difficulty with focusing which we discussed may be more due to refractory changes with aging.  Has had some static television like symptoms which we discussed could be related to the migraines.    3.  Please to have generalized body pain.  Remains on Cymbalta.  Has not seen rheumatology.    2/24/23  Patient demonstrating  1.  Headaches are similar to before.  She is tried 40 mg of nortriptyline without any significant benefit though it is helping her sleep.   Headaches are almost every day.  Tylenol does work as abortive therapy but not all the time.  2.  The bump that she was feeling in her left temple has not resolved.  She has not done the biopsy because of cost issues.  3.  Continues to have generalized body pain.  Does complain of some cold hands.  Remains on Cymbalta.  Has not seen rheumatology and we discussed about seeing rheumatology again.  No other new issues.    Previous history is reviewed and this is unchanged.    PAST MEDICAL/SURGICAL HISTORY  Past Medical History:   Diagnosis Date     Anxiety      Chronic headaches      Costochondritis      Genital herpes simplex type 1 infection      Mononucleosis      Recurrent UTI      Patient Active Problem List   Diagnosis     Abnormal uterine bleeding     Anxiety     Genital herpes simplex     Acute bronchitis     Uterine leiomyoma   Significant for frequent UTIs    FAMILY HISTORY  Family History   Problem Relation Age of Onset     Cancer Mother         Soft Palate     Aneurysm Mother         Brain     Nephrolithiasis Father      Colon Cancer Father 58.00     Colon Cancer Maternal Grandmother 65.00     Diabetes Maternal Grandfather      Cerebrovascular Disease Maternal Grandfather    Family history positive for rheumatoid arthritis in her mother.  No family history of headaches.    SOCIAL HISTORY  Social History     Tobacco Use     Smoking status: Never     Smokeless tobacco: Never   Substance Use Topics     Alcohol use: Not Currently     Comment: Alcoholic Drinks/day: rare     Drug use: No       SYSTEMS REVIEW  Twelve-system ROS was done and other than the HPI this was negative except for some back pain and arm and leg pain and sometimes neck pain, bladder symptoms, headaches, anxiety.  No sleep issues.  No new issues/concerns reported.    MEDICATIONS  DULoxetine (CYMBALTA) 30 MG capsule, every 24 hours  DULoxetine (CYMBALTA) 60 MG capsule, Take 60 mg by mouth  Meloxicam 10 MG CAPS, 1 tablet daily  predniSONE  (DELTASONE) 20 MG tablet, TAKE 2 TABLETS FOR 5 DAYS, THEN 1 TABLET FOR 5 DAYS  trimethoprim (TRIMPEX) 100 MG tablet, Take 100 mg by mouth  VALACYCLOVIR HCL PO,     No current facility-administered medications on file prior to visit.       PHYSICAL EXAMINATION  VITALS: /76   Pulse 76   Wt 90.7 kg (200 lb)   BMI 31.32 kg/m    GENERAL: Healthy appearing, alert, no acute distress, normal habitus.  CARDIOVASCULAR: Extremities warm and well perfused. Pulses present.   NEUROLOGICAL:  Patient is awake and oriented to self, place and time.  Attention span is normal.  Memory is grossly intact.  Language is fluent and follows commands appropriately.  Appropriate fund of knowledge. Cranial nerves 2-12 are intact. There is no pronator drift.  Motor exam shows 5/5 strength in all extremities.  Tone is symmetric bilaterally in upper and lower extremities.  (Previously reflexes are symmetric and 2+ in upper extremities and 2+ brisk in lower extremities. Sensory exam is grossly intact to light touch, pin prick and vibration.)  Finger to nose and heel to shin is without dysmetria.  Romberg is negative.  Gait is normal and the patient is able to do tandem walk and walk on toes and heels.  Exam similar to before.  Palpation of the scalp does not show any tenderness.    DIAGNOSTICS  OUTSIDE RECORDS  Outside referral notes reviewed.    MRI brain per patient report negative    LABS  Component      Latest Ref Rng & Units 7/8/2022   Sodium      136 - 145 mmol/L 139   Potassium      3.5 - 5.0 mmol/L 4.0   Chloride      98 - 107 mmol/L 107   Carbon Dioxide      22 - 31 mmol/L 27   Anion Gap      5 - 18 mmol/L 5   Urea Nitrogen      8 - 22 mg/dL 15   Creatinine      0.60 - 1.10 mg/dL 0.75   Calcium      8.5 - 10.5 mg/dL 8.8   Glucose      70 - 125 mg/dL 89   Alkaline Phosphatase      45 - 120 U/L 47   AST      0 - 40 U/L 18   ALT      0 - 45 U/L 17   Protein Total      6.0 - 8.0 g/dL 7.1   Albumin      3.5 - 5.0 g/dL 3.7   Bilirubin  Total      0.0 - 1.0 mg/dL 0.4   GFR Estimate      >60 mL/min/1.73m2 >90   Vitamin B12      232 - 1,245 pg/mL 411   TSH      0.30 - 5.00 uIU/mL 2.76   Vitamin B1 Whole Blood Level      70 - 180 nmol/L 73   Magnesium      1.8 - 2.6 mg/dL 2.1   CK Total      30 - 190 U/L 37   MIRNA interpretation      Negative Negative   Ferritin      6 - 175 ng/mL 131   Hemoglobin A1C      <=5.6 % 5.1     Component      Latest Ref Rng & Units 7/8/2022   Albumin Fraction      3.7 - 5.1 g/dL 4.1   Alpha 1 Fraction      0.2 - 0.4 g/dL 0.3   Alpha 2 Fraction      0.5 - 0.9 g/dL 0.6   Beta Fraction      0.6 - 1.0 g/dL 0.8   Gamma Fraction      0.7 - 1.6 g/dL 1.1   Monoclonal Peak      <=0.0 g/dL 0.0   ELP Interpretation:       Essentially normal electrophoretic pattern. No obvious monoclonal proteins seen. Pathologic significance requires clinical correlation. Alex Del Real M.D., Ph.D., Pathologist.   Immunofixation ELP       No monoclonal protein seen on immunofixation. Pathologic significance requires clinical correlation. Alex Del Real M.D., Ph.D., Pathologist   Total Protein Serum for ELP      6.4 - 8.3 g/dL 6.9     EMG  CLINICAL INTERPRETATION:  This is a normal nerve conduction and EMG study.  The low normal amplitude of the right peroneal nerve is of unclear significance and most likely artifactual.  Further clinical correlation is needed.     MRI C spine  IMPRESSION:   1.  Mild multilevel spondylosis described above.  2.  No critical thecal sac stenosis.  3.  No severe high-grade neural foraminal stenosis.  4.  No abnormal cord signal.      Component      Latest Ref Rng & Units 12/1/2022   C-Reactive Protein High Sensitivity       5.97   Sed Rate      0 - 20 mm/hr 28 (H)   Primary care notes reviewed.    MRI C spine- images reviewed  IMPRESSION:   1.  Mild multilevel spondylosis described above.  2.  No critical thecal sac stenosis.  3.  No severe high-grade neural foraminal stenosis.  4.  No abnormal cord signal.    MRA  brain-images reviewed.  No structural lesions.  IMPRESSION:  1.  No significant stenosis/large vessel occlusion, saccular aneurysm or high flow vascular malformation.    MRI brain-images reviewed no structural lesions.  IMPRESSION:  1.  No acute intracranial process. Specifically, no acute/subacute infarct, mass, acute hemorrhage or abnormal extra-axial fluid collection.    IMPRESSION/REPORT/PLAN  Intractable chronic migraine without aura and without status migrainosus  Muscle pain  Question of medication overuse headaches  Rule out fibromyalgia  Elevated ESR/CRP  Rule out temporal arteritis  Cool hands    This is a 47 year old female with headaches and generalized body pain    1.  Headaches:-This is suggestive of chronic migraines based on description.  She reportedly had an MRI of the brain 15 years ago that was negative.  Repeat MRI/MRA have been stable.  There is some family history of aneurysms.  Nortriptyline so far has not provide a lot of benefit though is helping her sleep.  We will add Topamax to see if it works.  Tylenol does work as abortive therapy though will prescribe Maxalt that she can use if the Tylenol does not work.    2.  With left scalp pain and the elevated ESR/CRP temporal biopsy was ordered.  She was put on steroids by primary care provider.  Currently the symptoms have resolved.  Because of cost issues she wants to hold off on getting a biopsy.  An eye clinic appointment was recommended in the past as well.    2.  Her generalized body pain would be nonneurological and possibly related to fibromyalgia.  She does complain of some muscle cramping with activity and would like to rule out neuromuscular disease.  EMG was noncontributory.  The peroneal nerve dysfunction order is most likely artifactual as it does not fit with exam.  Blood work has been noncontributory for neuromuscular disease.  We will check an MRI of the cervical spine since she does have slight hyperreflexia and complains of  muscle cramps.  MRI cervical spine was noncontributory.  Could consider getting a second opinion from rheumatology if neurological work-up is negative.  She does complain of cold hands and they can evaluate for Raynaud's disease as well.  Will refer to rheumatology.  She is already on Cymbalta.  She is tried gabapentin in the past and wants to try it again though since we are increasing the nortriptyline we will hold off on prescribing gabapentin.  No change for right now.    Return in 2 to 3 months.    -     nortriptyline (PAMELOR) 10 MG capsule; 4 caps/night  -     rizatriptan (MAXALT) 10 MG tablet; Take 1 tablet (10 mg) by mouth at onset of headache for migraine May repeat in 2 hours.  -     Adult Rheumatology  Referral; Future  -     topiramate (TOPAMAX) 25 MG tablet; Start with 1 tab/night and increase by 1 tablet/week as needed and tolerated to a max of 4 tabs/night.      Return in about 12 weeks (around 5/19/2023) for In-Clinic Visit (must), After testing.    Over 40 minutes were spent coordinating the care for the patient on the day of the encounter.  This includes previsit, during visit and post visit activities as documented above.  Counseling patient.  Multiple MRI images reviewed.  Prescription management.  Multiple problems reviewed/addressed.  (Activities include but not inclusive of reviewing chart, reviewing outside records, reviewing labs and imaging study results as well as the images, patient visit time including getting history and exam,  use if applicable, review of test results with the patient and coming up with a plan in a shared model, counseling patient and family, education and answering patient questions, EMR , EMR diagnosis entry and problem list management, medication reconciliation and prescription management if applicable, paperwork if applicable, printing documents and documentation of the visit activities.)    Gen Aceves MD  Neurologist  [employer]  [facility]  Tel:- [phone]    This note was dictated using voice recognition software.  Any grammatical or context distortions are unintentional and inherent to the software.

## 2023-05-17 ENCOUNTER — LAB REQUISITION (OUTPATIENT)
Dept: LAB | Facility: CLINIC | Age: 48
End: 2023-05-17
Payer: COMMERCIAL

## 2023-05-17 DIAGNOSIS — R20.9 UNSPECIFIED DISTURBANCES OF SKIN SENSATION: ICD-10-CM

## 2023-05-17 DIAGNOSIS — Z91.89 OTHER SPECIFIED PERSONAL RISK FACTORS, NOT ELSEWHERE CLASSIFIED: ICD-10-CM

## 2023-05-17 PROCEDURE — 80053 COMPREHEN METABOLIC PANEL: CPT | Mod: ORL | Performed by: PHYSICIAN ASSISTANT

## 2023-05-17 PROCEDURE — 84443 ASSAY THYROID STIM HORMONE: CPT | Mod: ORL | Performed by: PHYSICIAN ASSISTANT

## 2023-05-18 LAB
ALBUMIN SERPL BCG-MCNC: 4.3 G/DL (ref 3.5–5.2)
ALP SERPL-CCNC: 62 U/L (ref 35–104)
ALT SERPL W P-5'-P-CCNC: 11 U/L (ref 10–35)
ANION GAP SERPL CALCULATED.3IONS-SCNC: 10 MMOL/L (ref 7–15)
AST SERPL W P-5'-P-CCNC: 13 U/L (ref 10–35)
BILIRUB SERPL-MCNC: 0.4 MG/DL
BUN SERPL-MCNC: 13.8 MG/DL (ref 6–20)
CALCIUM SERPL-MCNC: 9 MG/DL (ref 8.6–10)
CHLORIDE SERPL-SCNC: 103 MMOL/L (ref 98–107)
CREAT SERPL-MCNC: 0.79 MG/DL (ref 0.51–0.95)
DEPRECATED HCO3 PLAS-SCNC: 26 MMOL/L (ref 22–29)
GFR SERPL CREATININE-BSD FRML MDRD: >90 ML/MIN/1.73M2
GLUCOSE SERPL-MCNC: 83 MG/DL (ref 70–99)
POTASSIUM SERPL-SCNC: 4.1 MMOL/L (ref 3.4–5.3)
PROT SERPL-MCNC: 6.6 G/DL (ref 6.4–8.3)
SODIUM SERPL-SCNC: 139 MMOL/L (ref 136–145)
TSH SERPL DL<=0.005 MIU/L-ACNC: 3.39 UIU/ML (ref 0.3–4.2)

## 2023-05-24 ENCOUNTER — LAB REQUISITION (OUTPATIENT)
Dept: LAB | Facility: CLINIC | Age: 48
End: 2023-05-24
Payer: COMMERCIAL

## 2023-05-24 DIAGNOSIS — Z91.89 OTHER SPECIFIED PERSONAL RISK FACTORS, NOT ELSEWHERE CLASSIFIED: ICD-10-CM

## 2023-05-24 PROCEDURE — 86618 LYME DISEASE ANTIBODY: CPT | Mod: ORL | Performed by: PHYSICIAN ASSISTANT

## 2023-05-25 ENCOUNTER — OFFICE VISIT (OUTPATIENT)
Dept: NEUROLOGY | Facility: CLINIC | Age: 48
End: 2023-05-25
Payer: COMMERCIAL

## 2023-05-25 VITALS
WEIGHT: 214 LBS | DIASTOLIC BLOOD PRESSURE: 72 MMHG | SYSTOLIC BLOOD PRESSURE: 124 MMHG | HEART RATE: 78 BPM | HEIGHT: 67 IN | BODY MASS INDEX: 33.59 KG/M2

## 2023-05-25 DIAGNOSIS — R20.9 COLD HANDS: ICD-10-CM

## 2023-05-25 DIAGNOSIS — G43.719 INTRACTABLE CHRONIC MIGRAINE WITHOUT AURA AND WITHOUT STATUS MIGRAINOSUS: Primary | ICD-10-CM

## 2023-05-25 DIAGNOSIS — M79.7 FIBROMYALGIA: ICD-10-CM

## 2023-05-25 DIAGNOSIS — T88.7XXA MEDICATION SIDE EFFECTS: ICD-10-CM

## 2023-05-25 PROCEDURE — 99214 OFFICE O/P EST MOD 30 MIN: CPT | Performed by: PSYCHIATRY & NEUROLOGY

## 2023-05-25 RX ORDER — VERAPAMIL HYDROCHLORIDE 120 MG/1
120 CAPSULE, EXTENDED RELEASE ORAL AT BEDTIME
Qty: 90 CAPSULE | Refills: 1 | Status: SHIPPED | OUTPATIENT
Start: 2023-05-25

## 2023-05-25 NOTE — NURSING NOTE
Chief Complaint   Patient presents with     Migraine     3 month follow up. She states she is still having daily headaches. Weaned herself off topiramate, didn't like the way it made her feel. Has been off for about a month.     Alicia Pham LPN on 5/25/2023 at 12:51 PM

## 2023-05-25 NOTE — PROGRESS NOTES
NEUROLOGY OUTPATIENT PROGRESS NOTE   May 25, 2023     CHIEF COMPLAINT/REASON FOR VISIT/REASON FOR CONSULT  Patient presents with:  Migraine: 3 month follow up. She states she is still having daily headaches. Weaned herself off topiramate, didn't like the way it made her feel. Has been off for about a month.    REASON FOR CONSULTATION- Headaches and body pain     REFERRAL SOURCE  Dr. Dallin Loredo   Dr. Dallin Loredo    HISTORY OF PRESENT ILLNESS  Mahogany Leal is a 48 year old female seen today for evaluation of headaches and body pain.  All her symptoms started 15 years ago.  There was no provoking factor 15 years ago.    1.  Headaches these are almost every day.  They can be more in the back of the head but sometimes radiate to the front.  They are more of a pressure with no significant photophobia or phonophobia.  She occasionally does get visual auras.  They can be all day long.  They do go away when she goes to sleep.  Denies any other provoking factors.  Has not tried any medications.  Does use Tylenol for abortive therapy.  She does use Tylenol multiple times a day.  She is also using it for dental bridging pain.  There is question of TMJ also.  Has had a MRI 15 years ago that was negative.    2.  She reports generalized arm and leg pain.  This is more of a dull bruising pain though occasionally can be sharp.  Activity does make it worse.  There is no associated numbness or weakness though reports that sometimes her arms feel heavy.  She does complain of some neck pain and low back pain.  She has had some MRIs of her low back done through [facility].  Recently had an x-ray of her neck done through [facility] both of these were noncontributory (per patient report).  Was seen by her rheumatologist 15 years ago and was thought to have fibromyalgia versus connective tissue disease.  Sitting for too long as well because cramping in the back of her legs.    9/20/22  Patient returns  today  1.  Headaches have slowly gotten little bit better with the nortriptyline.  She is only given 20 mg of nortriptyline.  Sleeping better at night with the nortriptyline.  No side effects.  Has not gotten beyond the 20 mg as she is also on Cymbalta.  Headaches are still every day but less severe compared to before.  Tylenol generally works for abortive therapy.  He is not taking any prescription medications for headaches  2.  Continues to have the muscle pain all over.  Has not had an MRI done through [facility] and would like to schedule one of her neck.  Discussed about possibility of fibromyalgia.  She has tried gabapentin in the past but wants to give another try.  Cymbalta does not help with the muscle pain.  No other new concerns today.    12/20/22  Patient returns today  1.  Headaches are similar.  They have become less severe though still every day.  They are still in the left temple.  Nortriptyline does help but nothing significantly.  She is only taking the 30 mg of nortriptyline.  I will increase it to 40 mg.  The nortriptyline does help her fall asleep.  2.  About a month ago she was palpating her left temple.  She noticed a bump.  She was seen by her primary care provider.  This was thought to be possible temporal arteritis.  An ESR/CRP was checked.  This came back elevated.  Patient was recommended to follow-up with neurology.  She was put on 10 days of steroids which she is currently taking.  No longer notices the problem for the pain.  This is different than her headaches.  She has not had any vision loss.  Does complain of some difficulty with focusing which we discussed may be more due to refractory changes with aging.  Has had some static television like symptoms which we discussed could be related to the migraines.    3.  Please to have generalized body pain.  Remains on Cymbalta.  Has not seen rheumatology.    2/24/23  Patient demonstrating  1.  Headaches are similar to before.  She  is tried 40 mg of nortriptyline without any significant benefit though it is helping her sleep.  Headaches are almost every day.  Tylenol does work as abortive therapy but not all the time.  2.  The bump that she was feeling in her left temple has not resolved.  She has not done the biopsy because of cost issues.  3.  Continues to have generalized body pain.  Does complain of some cold hands.  Remains on Cymbalta.  Has not seen rheumatology and we discussed about seeing rheumatology again.  No other new issues.    5/25/23  Patient returns today  1.  Headaches are still every day.  Topamax caused side effects and she stopped the medication.  No change in the nature of the headaches.  Remains on nortriptyline which helps her sleep.  Is not helping with the headache.  Is mainly using Tylenol for abortive therapy.  Has not needed to use the Maxalt  2.  Is interested in doing Botox injections.  3.  Continues to have cold hands.  Wants to try a calcium channel blocker which might also help with the headaches.  Has not been able to see rheumatology for generalized pain.    Previous history is reviewed and this is unchanged.    PAST MEDICAL/SURGICAL HISTORY  Past Medical History:   Diagnosis Date     Anxiety      Chronic headaches      Costochondritis      Genital herpes simplex type 1 infection      Mononucleosis      Recurrent UTI      Patient Active Problem List   Diagnosis     Abnormal uterine bleeding     Anxiety     Genital herpes simplex     Acute bronchitis     Uterine leiomyoma   Significant for frequent UTIs    FAMILY HISTORY  Family History   Problem Relation Age of Onset     Cancer Mother         Soft Palate     Aneurysm Mother         Brain     Nephrolithiasis Father      Colon Cancer Father 58.00     Colon Cancer Maternal Grandmother 65.00     Diabetes Maternal Grandfather      Cerebrovascular Disease Maternal Grandfather    Family history positive for rheumatoid arthritis in her mother.  No family history of  "headaches.    SOCIAL HISTORY  Social History     Tobacco Use     Smoking status: Never     Smokeless tobacco: Never   Substance Use Topics     Alcohol use: Not Currently     Comment: Alcoholic Drinks/day: rare     Drug use: No       SYSTEMS REVIEW  Twelve-system ROS was done and other than the HPI this was negative except for some back pain and arm and leg pain and sometimes neck pain, bladder symptoms, headaches, anxiety.  No sleep issues.  No new issues.    MEDICATIONS  DULoxetine (CYMBALTA) 30 MG capsule, every 24 hours  DULoxetine (CYMBALTA) 60 MG capsule, Take 60 mg by mouth  nortriptyline (PAMELOR) 10 MG capsule, 4 caps/night  rizatriptan (MAXALT) 10 MG tablet, Take 1 tablet (10 mg) by mouth at onset of headache for migraine May repeat in 2 hours.  trimethoprim (TRIMPEX) 100 MG tablet, Take 100 mg by mouth prn  VALACYCLOVIR HCL PO,   Meloxicam 10 MG CAPS, 1 tablet daily (Patient not taking: Reported on 5/25/2023)  predniSONE (DELTASONE) 20 MG tablet, TAKE 2 TABLETS FOR 5 DAYS, THEN 1 TABLET FOR 5 DAYS (Patient not taking: Reported on 5/25/2023)    No current facility-administered medications on file prior to visit.       PHYSICAL EXAMINATION  VITALS: /72 (BP Location: Left arm, Patient Position: Sitting)   Pulse 78   Ht 1.702 m (5' 7\")   Wt 97.1 kg (214 lb)   BMI 33.52 kg/m    GENERAL: Healthy appearing, alert, no acute distress, normal habitus.  CARDIOVASCULAR: Extremities warm and well perfused. Pulses present.   NEUROLOGICAL:  Patient is awake and oriented to self, place and time.  Attention span is normal.  Memory is grossly intact.  Language is fluent and follows commands appropriately.  Appropriate fund of knowledge. Cranial nerves 2-12 are intact. There is no pronator drift.  Motor exam shows 5/5 strength in all extremities.  Tone is symmetric bilaterally in upper and lower extremities.  (Previously reflexes are symmetric and 2+ in upper extremities and 2+ brisk in lower extremities. Sensory " exam is grossly intact to light touch, pin prick and vibration.)  Finger to nose and heel to shin is without dysmetria.  Romberg is negative.  Gait is normal and the patient is able to do tandem walk and walk on toes and heels.  Exam stable.  Palpation of the scalp does not show any tenderness.    DIAGNOSTICS  OUTSIDE RECORDS  Outside referral notes reviewed.    MRI brain per patient report negative    LABS  Component      Latest Ref Rng & Units 7/8/2022   Sodium      136 - 145 mmol/L 139   Potassium      3.5 - 5.0 mmol/L 4.0   Chloride      98 - 107 mmol/L 107   Carbon Dioxide      22 - 31 mmol/L 27   Anion Gap      5 - 18 mmol/L 5   Urea Nitrogen      8 - 22 mg/dL 15   Creatinine      0.60 - 1.10 mg/dL 0.75   Calcium      8.5 - 10.5 mg/dL 8.8   Glucose      70 - 125 mg/dL 89   Alkaline Phosphatase      45 - 120 U/L 47   AST      0 - 40 U/L 18   ALT      0 - 45 U/L 17   Protein Total      6.0 - 8.0 g/dL 7.1   Albumin      3.5 - 5.0 g/dL 3.7   Bilirubin Total      0.0 - 1.0 mg/dL 0.4   GFR Estimate      >60 mL/min/1.73m2 >90   Vitamin B12      232 - 1,245 pg/mL 411   TSH      0.30 - 5.00 uIU/mL 2.76   Vitamin B1 Whole Blood Level      70 - 180 nmol/L 73   Magnesium      1.8 - 2.6 mg/dL 2.1   CK Total      30 - 190 U/L 37   MIRNA interpretation      Negative Negative   Ferritin      6 - 175 ng/mL 131   Hemoglobin A1C      <=5.6 % 5.1     Component      Latest Ref Rng & Units 7/8/2022   Albumin Fraction      3.7 - 5.1 g/dL 4.1   Alpha 1 Fraction      0.2 - 0.4 g/dL 0.3   Alpha 2 Fraction      0.5 - 0.9 g/dL 0.6   Beta Fraction      0.6 - 1.0 g/dL 0.8   Gamma Fraction      0.7 - 1.6 g/dL 1.1   Monoclonal Peak      <=0.0 g/dL 0.0   ELP Interpretation:       Essentially normal electrophoretic pattern. No obvious monoclonal proteins seen. Pathologic significance requires clinical correlation. Alex Del Real M.D., Ph.D., Pathologist.   Immunofixation ELP       No monoclonal protein seen on immunofixation. Pathologic  significance requires clinical correlation. Alex Del Real M.D., Ph.D., Pathologist   Total Protein Serum for ELP      6.4 - 8.3 g/dL 6.9     EMG  CLINICAL INTERPRETATION:  This is a normal nerve conduction and EMG study.  The low normal amplitude of the right peroneal nerve is of unclear significance and most likely artifactual.  Further clinical correlation is needed.     MRI C spine  IMPRESSION:   1.  Mild multilevel spondylosis described above.  2.  No critical thecal sac stenosis.  3.  No severe high-grade neural foraminal stenosis.  4.  No abnormal cord signal.      Component      Latest Ref Rng & Units 12/1/2022   C-Reactive Protein High Sensitivity       5.97   Sed Rate      0 - 20 mm/hr 28 (H)   Primary care notes reviewed.    MRI C spine- images reviewed  IMPRESSION:   1.  Mild multilevel spondylosis described above.  2.  No critical thecal sac stenosis.  3.  No severe high-grade neural foraminal stenosis.  4.  No abnormal cord signal.    MRA brain-images reviewed.  No structural lesions.  IMPRESSION:  1.  No significant stenosis/large vessel occlusion, saccular aneurysm or high flow vascular malformation.    MRI brain-images reviewed no structural lesions.  IMPRESSION:  1.  No acute intracranial process. Specifically, no acute/subacute infarct, mass, acute hemorrhage or abnormal extra-axial fluid collection.    IMPRESSION/REPORT/PLAN  Intractable chronic migraine without aura and without status migrainosus  Muscle pain  Question of medication overuse headaches  Rule out fibromyalgia  Elevated ESR/CRP  Rule out temporal arteritis  Cool hands  Medication side effect    This is a 48 year old female with headaches and generalized body pain    1.  Headaches:-This is suggestive of chronic migraines based on description.  She reportedly had an MRI of the brain 15 years ago that was negative.  Repeat MRI/MRA have been stable.  There is some family history of aneurysms.  Nortriptyline so far has not provide a  lot of benefit though is helping her sleep and will continue.  Topamax caused side effects.  We will try verapamil which might also help with prevention of headaches and cold hands/.  We will also try to get approval for Botox injections.  Tylenol does work as abortive therapy though will prescribe Maxalt that she can use if the Tylenol does not work.    2.  With left scalp pain and the elevated ESR/CRP temporal biopsy was ordered.  She was put on steroids by primary care provider.  Currently the symptoms have resolved.  Because of cost issues she wants to hold off on getting a biopsy.  An eye clinic appointment was recommended in the past as well.  Stable.    2.  Her generalized body pain would be nonneurological and possibly related to fibromyalgia.  She does complain of some muscle cramping with activity and would like to rule out neuromuscular disease.  EMG was noncontributory.  The peroneal nerve dysfunction order is most likely artifactual as it does not fit with exam.  Blood work has been noncontributory for neuromuscular disease.  We will check an MRI of the cervical spine since she does have slight hyperreflexia and complains of muscle cramps.  MRI cervical spine was noncontributory.  Could consider getting a second opinion from rheumatology if neurological work-up is negative.  She does complain of cold hands and they can evaluate for Raynaud's disease as well.  Will refer to rheumatology.  She is already on Cymbalta.  She is tried gabapentin in the past and wants to try it again though since we are increasing the nortriptyline we will hold off on prescribing gabapentin.  No change for right now.  Rheumatology referral pending.    Return in 6 weeks.    -     nortriptyline (PAMELOR) 10 MG capsule; 4 caps/night  -     rizatriptan (MAXALT) 10 MG tablet; Take 1 tablet (10 mg) by mouth at onset of headache for migraine May repeat in 2 hours.  -     Adult Rheumatology  Referral; Future  -     verapamil  "ER (VERELAN) 120 MG 24 hr capsule; Take 1 capsule (120 mg) by mouth At Bedtime  -     Botulinum Toxin Type A (BOTOX) 200 units injection 155 Units      Return in about 6 weeks (around 7/6/2023) for In-Clinic Visit (must), Botox.    Over 35 minutes were spent coordinating the care for the patient on the day of the encounter.  This includes previsit, during visit and post visit activities as documented above.  Counseling patient.  Prescription management multiple problems reviewed/addressed.  Refractory problems.  (Activities include but not inclusive of reviewing chart, reviewing outside records, reviewing labs and imaging study results as well as the images, patient visit time including getting history and exam,  use if applicable, review of test results with the patient and coming up with a plan in a shared model, counseling patient and family, education and answering patient questions, EMR , EMR diagnosis entry and problem list management, medication reconciliation and prescription management if applicable, paperwork if applicable, printing documents and documentation of the visit activities.)    Botox PA:-  \" The patient has a diagnosis of chronic migraines. She has more than 15 headache days a month with each headache lasting at least 4 hours despite use of triptans. Her headaches have been there for over 6 months. She has been screened for medication overuse headaches and she does not have that. She has tried nortriptyline, Topamax, verapamil for 3 months without any significant benefit. I would request that the Botox be approved for her. \"      Gen Aceves MD  Neurologist  [facility]  Tel:- [phone]    This note was dictated using voice recognition software.  Any grammatical or context distortions are unintentional and inherent to the software.    "

## 2023-05-25 NOTE — LETTER
5/25/2023         RE: Mahogany Leal  [address]  [facility] [place] [address]        Dear Colleague,    Thank you for referring your patient, Mahogany Leal, to the [facility] [place]. Please see a copy of my visit note below.    NEUROLOGY OUTPATIENT PROGRESS NOTE   May 25, 2023     CHIEF COMPLAINT/REASON FOR VISIT/REASON FOR CONSULT  Patient presents with:  Migraine: 3 month follow up. She states she is still having daily headaches. Weaned herself off topiramate, didn't like the way it made her feel. Has been off for about a month.    REASON FOR CONSULTATION- Headaches and body pain     REFERRAL SOURCE  Dr. Dallin Loredo  CC Dr. Dallin Loredo    HISTORY OF PRESENT ILLNESS  Mahogany Leal is a 48 year old female seen today for evaluation of headaches and body pain.  All her symptoms started 15 years ago.  There was no provoking factor 15 years ago.    1.  Headaches these are almost every day.  They can be more in the back of the head but sometimes radiate to the front.  They are more of a pressure with no significant photophobia or phonophobia.  She occasionally does get visual auras.  They can be all day long.  They do go away when she goes to sleep.  Denies any other provoking factors.  Has not tried any medications.  Does use Tylenol for abortive therapy.  She does use Tylenol multiple times a day.  She is also using it for dental bridging pain.  There is question of TMJ also.  Has had a MRI 15 years ago that was negative.    2.  She reports generalized arm and leg pain.  This is more of a dull bruising pain though occasionally can be sharp.  Activity does make it worse.  There is no associated numbness or weakness though reports that sometimes her arms feel heavy.  She does complain of some neck pain and low back pain.  She has had some MRIs of her low back done through [facility].  Recently had an x-ray of her neck done through [facility] both of these were  noncontributory (per patient report).  Was seen by her rheumatologist 15 years ago and was thought to have fibromyalgia versus connective tissue disease.  Sitting for too long as well because cramping in the back of her legs.    9/20/22  Patient returns today  1.  Headaches have slowly gotten little bit better with the nortriptyline.  She is only given 20 mg of nortriptyline.  Sleeping better at night with the nortriptyline.  No side effects.  Has not gotten beyond the 20 mg as she is also on Cymbalta.  Headaches are still every day but less severe compared to before.  Tylenol generally works for abortive therapy.  He is not taking any prescription medications for headaches  2.  Continues to have the muscle pain all over.  Has not had an MRI done through [facility] and would like to schedule one of her neck.  Discussed about possibility of fibromyalgia.  She has tried gabapentin in the past but wants to give another try.  Cymbalta does not help with the muscle pain.  No other new concerns today.    12/20/22  Patient returns today  1.  Headaches are similar.  They have become less severe though still every day.  They are still in the left temple.  Nortriptyline does help but nothing significantly.  She is only taking the 30 mg of nortriptyline.  I will increase it to 40 mg.  The nortriptyline does help her fall asleep.  2.  About a month ago she was palpating her left temple.  She noticed a bump.  She was seen by her primary care provider.  This was thought to be possible temporal arteritis.  An ESR/CRP was checked.  This came back elevated.  Patient was recommended to follow-up with neurology.  She was put on 10 days of steroids which she is currently taking.  No longer notices the problem for the pain.  This is different than her headaches.  She has not had any vision loss.  Does complain of some difficulty with focusing which we discussed may be more due to refractory changes with aging.  Has had some  static television like symptoms which we discussed could be related to the migraines.    3.  Please to have generalized body pain.  Remains on Cymbalta.  Has not seen rheumatology.    2/24/23  Patient demonstrating  1.  Headaches are similar to before.  She is tried 40 mg of nortriptyline without any significant benefit though it is helping her sleep.  Headaches are almost every day.  Tylenol does work as abortive therapy but not all the time.  2.  The bump that she was feeling in her left temple has not resolved.  She has not done the biopsy because of cost issues.  3.  Continues to have generalized body pain.  Does complain of some cold hands.  Remains on Cymbalta.  Has not seen rheumatology and we discussed about seeing rheumatology again.  No other new issues.    5/25/23  Patient returns today  1.  Headaches are still every day.  Topamax caused side effects and she stopped the medication.  No change in the nature of the headaches.  Remains on nortriptyline which helps her sleep.  Is not helping with the headache.  Is mainly using Tylenol for abortive therapy.  Has not needed to use the Maxalt  2.  Is interested in doing Botox injections.  3.  Continues to have cold hands.  Wants to try a calcium channel blocker which might also help with the headaches.  Has not been able to see rheumatology for generalized pain.    Previous history is reviewed and this is unchanged.    PAST MEDICAL/SURGICAL HISTORY  Past Medical History:   Diagnosis Date     Anxiety      Chronic headaches      Costochondritis      Genital herpes simplex type 1 infection      Mononucleosis      Recurrent UTI      Patient Active Problem List   Diagnosis     Abnormal uterine bleeding     Anxiety     Genital herpes simplex     Acute bronchitis     Uterine leiomyoma   Significant for frequent UTIs    FAMILY HISTORY  Family History   Problem Relation Age of Onset     Cancer Mother         Soft Palate     Aneurysm Mother         Brain      "Nephrolithiasis Father      Colon Cancer Father 58.00     Colon Cancer Maternal Grandmother 65.00     Diabetes Maternal Grandfather      Cerebrovascular Disease Maternal Grandfather    Family history positive for rheumatoid arthritis in her mother.  No family history of headaches.    SOCIAL HISTORY  Social History     Tobacco Use     Smoking status: Never     Smokeless tobacco: Never   Substance Use Topics     Alcohol use: Not Currently     Comment: Alcoholic Drinks/day: rare     Drug use: No       SYSTEMS REVIEW  Twelve-system ROS was done and other than the HPI this was negative except for some back pain and arm and leg pain and sometimes neck pain, bladder symptoms, headaches, anxiety.  No sleep issues.  No new issues.    MEDICATIONS  DULoxetine (CYMBALTA) 30 MG capsule, every 24 hours  DULoxetine (CYMBALTA) 60 MG capsule, Take 60 mg by mouth  nortriptyline (PAMELOR) 10 MG capsule, 4 caps/night  rizatriptan (MAXALT) 10 MG tablet, Take 1 tablet (10 mg) by mouth at onset of headache for migraine May repeat in 2 hours.  trimethoprim (TRIMPEX) 100 MG tablet, Take 100 mg by mouth prn  VALACYCLOVIR HCL PO,   Meloxicam 10 MG CAPS, 1 tablet daily (Patient not taking: Reported on 5/25/2023)  predniSONE (DELTASONE) 20 MG tablet, TAKE 2 TABLETS FOR 5 DAYS, THEN 1 TABLET FOR 5 DAYS (Patient not taking: Reported on 5/25/2023)    No current facility-administered medications on file prior to visit.       PHYSICAL EXAMINATION  VITALS: /72 (BP Location: Left arm, Patient Position: Sitting)   Pulse 78   Ht 1.702 m (5' 7\")   Wt 97.1 kg (214 lb)   BMI 33.52 kg/m    GENERAL: Healthy appearing, alert, no acute distress, normal habitus.  CARDIOVASCULAR: Extremities warm and well perfused. Pulses present.   NEUROLOGICAL:  Patient is awake and oriented to self, place and time.  Attention span is normal.  Memory is grossly intact.  Language is fluent and follows commands appropriately.  Appropriate fund of knowledge. Cranial " nerves 2-12 are intact. There is no pronator drift.  Motor exam shows 5/5 strength in all extremities.  Tone is symmetric bilaterally in upper and lower extremities.  (Previously reflexes are symmetric and 2+ in upper extremities and 2+ brisk in lower extremities. Sensory exam is grossly intact to light touch, pin prick and vibration.)  Finger to nose and heel to shin is without dysmetria.  Romberg is negative.  Gait is normal and the patient is able to do tandem walk and walk on toes and heels.  Exam stable.  Palpation of the scalp does not show any tenderness.    DIAGNOSTICS  OUTSIDE RECORDS  Outside referral notes reviewed.    MRI brain per patient report negative    LABS  Component      Latest Ref Rng & Units 7/8/2022   Sodium      136 - 145 mmol/L 139   Potassium      3.5 - 5.0 mmol/L 4.0   Chloride      98 - 107 mmol/L 107   Carbon Dioxide      22 - 31 mmol/L 27   Anion Gap      5 - 18 mmol/L 5   Urea Nitrogen      8 - 22 mg/dL 15   Creatinine      0.60 - 1.10 mg/dL 0.75   Calcium      8.5 - 10.5 mg/dL 8.8   Glucose      70 - 125 mg/dL 89   Alkaline Phosphatase      45 - 120 U/L 47   AST      0 - 40 U/L 18   ALT      0 - 45 U/L 17   Protein Total      6.0 - 8.0 g/dL 7.1   Albumin      3.5 - 5.0 g/dL 3.7   Bilirubin Total      0.0 - 1.0 mg/dL 0.4   GFR Estimate      >60 mL/min/1.73m2 >90   Vitamin B12      232 - 1,245 pg/mL 411   TSH      0.30 - 5.00 uIU/mL 2.76   Vitamin B1 Whole Blood Level      70 - 180 nmol/L 73   Magnesium      1.8 - 2.6 mg/dL 2.1   CK Total      30 - 190 U/L 37   MIRNA interpretation      Negative Negative   Ferritin      6 - 175 ng/mL 131   Hemoglobin A1C      <=5.6 % 5.1     Component      Latest Ref Rng & Units 7/8/2022   Albumin Fraction      3.7 - 5.1 g/dL 4.1   Alpha 1 Fraction      0.2 - 0.4 g/dL 0.3   Alpha 2 Fraction      0.5 - 0.9 g/dL 0.6   Beta Fraction      0.6 - 1.0 g/dL 0.8   Gamma Fraction      0.7 - 1.6 g/dL 1.1   Monoclonal Peak      <=0.0 g/dL 0.0   ELP Interpretation:        Essentially normal electrophoretic pattern. No obvious monoclonal proteins seen. Pathologic significance requires clinical correlation. Alex Del Real M.D., Ph.D., Pathologist.   Immunofixation ELP       No monoclonal protein seen on immunofixation. Pathologic significance requires clinical correlation. Alex Del Real M.D., Ph.D., Pathologist   Total Protein Serum for ELP      6.4 - 8.3 g/dL 6.9     EMG  CLINICAL INTERPRETATION:  This is a normal nerve conduction and EMG study.  The low normal amplitude of the right peroneal nerve is of unclear significance and most likely artifactual.  Further clinical correlation is needed.     MRI C spine  IMPRESSION:   1.  Mild multilevel spondylosis described above.  2.  No critical thecal sac stenosis.  3.  No severe high-grade neural foraminal stenosis.  4.  No abnormal cord signal.      Component      Latest Ref Rng & Units 12/1/2022   C-Reactive Protein High Sensitivity       5.97   Sed Rate      0 - 20 mm/hr 28 (H)   Primary care notes reviewed.    MRI C spine- images reviewed  IMPRESSION:   1.  Mild multilevel spondylosis described above.  2.  No critical thecal sac stenosis.  3.  No severe high-grade neural foraminal stenosis.  4.  No abnormal cord signal.    MRA brain-images reviewed.  No structural lesions.  IMPRESSION:  1.  No significant stenosis/large vessel occlusion, saccular aneurysm or high flow vascular malformation.    MRI brain-images reviewed no structural lesions.  IMPRESSION:  1.  No acute intracranial process. Specifically, no acute/subacute infarct, mass, acute hemorrhage or abnormal extra-axial fluid collection.    IMPRESSION/REPORT/PLAN  Intractable chronic migraine without aura and without status migrainosus  Muscle pain  Question of medication overuse headaches  Rule out fibromyalgia  Elevated ESR/CRP  Rule out temporal arteritis  Cool hands  Medication side effect    This is a 48 year old female with headaches and generalized body  pain    1.  Headaches:-This is suggestive of chronic migraines based on description.  She reportedly had an MRI of the brain 15 years ago that was negative.  Repeat MRI/MRA have been stable.  There is some family history of aneurysms.  Nortriptyline so far has not provide a lot of benefit though is helping her sleep and will continue.  Topamax caused side effects.  We will try verapamil which might also help with prevention of headaches and cold hands/.  We will also try to get approval for Botox injections.  Tylenol does work as abortive therapy though will prescribe Maxalt that she can use if the Tylenol does not work.    2.  With left scalp pain and the elevated ESR/CRP temporal biopsy was ordered.  She was put on steroids by primary care provider.  Currently the symptoms have resolved.  Because of cost issues she wants to hold off on getting a biopsy.  An eye clinic appointment was recommended in the past as well.  Stable.    2.  Her generalized body pain would be nonneurological and possibly related to fibromyalgia.  She does complain of some muscle cramping with activity and would like to rule out neuromuscular disease.  EMG was noncontributory.  The peroneal nerve dysfunction order is most likely artifactual as it does not fit with exam.  Blood work has been noncontributory for neuromuscular disease.  We will check an MRI of the cervical spine since she does have slight hyperreflexia and complains of muscle cramps.  MRI cervical spine was noncontributory.  Could consider getting a second opinion from rheumatology if neurological work-up is negative.  She does complain of cold hands and they can evaluate for Raynaud's disease as well.  Will refer to rheumatology.  She is already on Cymbalta.  She is tried gabapentin in the past and wants to try it again though since we are increasing the nortriptyline we will hold off on prescribing gabapentin.  No change for right now.  Rheumatology referral  "pending.    Return in 6 weeks.    -     nortriptyline (PAMELOR) 10 MG capsule; 4 caps/night  -     rizatriptan (MAXALT) 10 MG tablet; Take 1 tablet (10 mg) by mouth at onset of headache for migraine May repeat in 2 hours.  -     Adult Rheumatology  Referral; Future  -     verapamil ER (VERELAN) 120 MG 24 hr capsule; Take 1 capsule (120 mg) by mouth At Bedtime  -     Botulinum Toxin Type A (BOTOX) 200 units injection 155 Units      Return in about 6 weeks (around 7/6/2023) for In-Clinic Visit (must), Botox.    Over 35 minutes were spent coordinating the care for the patient on the day of the encounter.  This includes previsit, during visit and post visit activities as documented above.  Counseling patient.  Prescription management multiple problems reviewed/addressed.  Refractory problems.  (Activities include but not inclusive of reviewing chart, reviewing outside records, reviewing labs and imaging study results as well as the images, patient visit time including getting history and exam,  use if applicable, review of test results with the patient and coming up with a plan in a shared model, counseling patient and family, education and answering patient questions, EMR , EMR diagnosis entry and problem list management, medication reconciliation and prescription management if applicable, paperwork if applicable, printing documents and documentation of the visit activities.)    Botox PA:-  \" The patient has a diagnosis of chronic migraines. She has more than 15 headache days a month with each headache lasting at least 4 hours despite use of triptans. Her headaches have been there for over 6 months. She has been screened for medication overuse headaches and she does not have that. She has tried nortriptyline, Topamax, verapamil for 3 months without any significant benefit. I would request that the Botox be approved for her. \"      Gen Aceves MD  Neurologist  [facility] " Neurology Clinic [facility]  Tel:- [phone]    This note was dictated using voice recognition software.  Any grammatical or context distortions are unintentional and inherent to the software.        Again, thank you for allowing me to participate in the care of your patient.        Sincerely,        Gen Aceves MD

## 2023-05-26 LAB — B BURGDOR IGG+IGM SER QL: 0.76

## 2023-07-30 ENCOUNTER — HEALTH MAINTENANCE LETTER (OUTPATIENT)
Age: 48
End: 2023-07-30

## 2023-07-31 DIAGNOSIS — G43.719 INTRACTABLE CHRONIC MIGRAINE WITHOUT AURA AND WITHOUT STATUS MIGRAINOSUS: ICD-10-CM

## 2023-07-31 RX ORDER — NORTRIPTYLINE HCL 10 MG
CAPSULE ORAL
Qty: 360 CAPSULE | Refills: 1 | Status: SHIPPED | OUTPATIENT
Start: 2023-07-31 | End: 2023-11-01

## 2023-07-31 NOTE — TELEPHONE ENCOUNTER
Refill request for: nortriptyline (PAMELOR) 10 MG capsule    Directions: 4 caps/night     LOV: 5/25/23  NOV: Not scheduled    90 day supply with 1 refills Medication T'd for review and signature  Dolores Lr CMA on 7/31/2023 at 11:51 AM

## 2023-08-02 ENCOUNTER — LAB REQUISITION (OUTPATIENT)
Dept: LAB | Facility: CLINIC | Age: 48
End: 2023-08-02
Payer: COMMERCIAL

## 2023-08-02 ENCOUNTER — TRANSFERRED RECORDS (OUTPATIENT)
Dept: HEALTH INFORMATION MANAGEMENT | Facility: CLINIC | Age: 48
End: 2023-08-02

## 2023-08-02 DIAGNOSIS — K81.0 ACUTE CHOLECYSTITIS: ICD-10-CM

## 2023-08-02 PROCEDURE — 83690 ASSAY OF LIPASE: CPT | Mod: ORL | Performed by: FAMILY MEDICINE

## 2023-08-02 PROCEDURE — 80076 HEPATIC FUNCTION PANEL: CPT | Mod: ORL | Performed by: FAMILY MEDICINE

## 2023-08-03 ENCOUNTER — MEDICAL CORRESPONDENCE (OUTPATIENT)
Dept: HEALTH INFORMATION MANAGEMENT | Facility: CLINIC | Age: 48
End: 2023-08-03
Payer: COMMERCIAL

## 2023-08-03 LAB
ALBUMIN SERPL BCG-MCNC: 4.2 G/DL (ref 3.5–5.2)
ALP SERPL-CCNC: 263 U/L (ref 35–104)
ALT SERPL W P-5'-P-CCNC: 295 U/L (ref 0–50)
AST SERPL W P-5'-P-CCNC: 66 U/L (ref 0–45)
BILIRUB DIRECT SERPL-MCNC: 0.29 MG/DL (ref 0–0.3)
BILIRUB SERPL-MCNC: 0.5 MG/DL
LIPASE SERPL-CCNC: 39 U/L (ref 13–60)
PROT SERPL-MCNC: 6.8 G/DL (ref 6.4–8.3)

## 2023-08-04 ENCOUNTER — TRANSCRIBE ORDERS (OUTPATIENT)
Dept: OTHER | Age: 48
End: 2023-08-04

## 2023-08-04 DIAGNOSIS — K81.0 ACUTE CHOLECYSTITIS: Primary | ICD-10-CM

## 2023-11-01 ENCOUNTER — TELEPHONE (OUTPATIENT)
Dept: NEUROLOGY | Facility: CLINIC | Age: 48
End: 2023-11-01
Payer: COMMERCIAL

## 2023-11-01 DIAGNOSIS — G43.719 INTRACTABLE CHRONIC MIGRAINE WITHOUT AURA AND WITHOUT STATUS MIGRAINOSUS: ICD-10-CM

## 2023-11-01 RX ORDER — NORTRIPTYLINE HCL 10 MG
CAPSULE ORAL
Qty: 360 CAPSULE | Refills: 1 | Status: SHIPPED | OUTPATIENT
Start: 2023-11-01

## 2023-11-01 NOTE — TELEPHONE ENCOUNTER
Health Call Center    Phone Message    May a detailed message be left on voicemail: yes     Reason for Call: Medication Question or concern regarding medication   Prescription Clarification  Name of Medication: nortriptyline (PAMELOR) 10 MG capsule    Prescribing Provider:     Pharmacy:   [facility] [address] [place] [address]      What on the order needs clarification?  Patient called states that she is out of medication, patient is rescheduled for a follow up on 2-5-2024. Patient asking for refills to last her until she sees . Patient states that the last few days she been without she has not been feeling well, wondering if the side effects from not taking medications?       Action Taken: Other: [facility] neurology    Travel Screening: Not Applicable

## 2023-11-01 NOTE — TELEPHONE ENCOUNTER
LOV: 23    NOV: 2024    Per LOV note:   Return in 6 weeks.  -     nortriptyline (PAMELOR) 10 MG capsule; 4 caps/night    Refill: Signed Prescriptions:                        Disp   Refills    nortriptyline (PAMELOR) 10 MG capsule      360 ca*1        Si caps/night  Authorizing Provider: DAVID ANNA  Ordering User: DAMIAN HER    Patient has follow-up, and called to let them know about refill being sent to pharmacy.    Damian Her RN, BSN  [facility]

## 2024-01-15 ENCOUNTER — MYC REFILL (OUTPATIENT)
Dept: FAMILY MEDICINE | Facility: CLINIC | Age: 49
End: 2024-01-15
Payer: COMMERCIAL

## 2024-01-15 DIAGNOSIS — N39.0 RECURRENT UTI: Primary | ICD-10-CM

## 2024-01-15 DIAGNOSIS — F41.9 ANXIETY: Primary | ICD-10-CM

## 2024-01-15 RX ORDER — TRIMETHOPRIM 100 MG/1
100 TABLET ORAL DAILY PRN
Qty: 90 TABLET | Refills: 0 | Status: SHIPPED | OUTPATIENT
Start: 2024-01-15

## 2024-01-15 RX ORDER — DULOXETIN HYDROCHLORIDE 30 MG/1
30 CAPSULE, DELAYED RELEASE ORAL DAILY
Qty: 90 CAPSULE | Refills: 0 | Status: SHIPPED | OUTPATIENT
Start: 2024-01-15 | End: 2024-04-14

## 2024-01-15 RX ORDER — DULOXETIN HYDROCHLORIDE 60 MG/1
60 CAPSULE, DELAYED RELEASE ORAL DAILY
Qty: 90 CAPSULE | Refills: 0 | Status: SHIPPED | OUTPATIENT
Start: 2024-01-15 | End: 2024-04-14

## 2024-01-15 NOTE — TELEPHONE ENCOUNTER
Writer responded via [employer].  SHWETHA BaileyN, RN-BC  [facility]

## 2024-01-15 NOTE — TELEPHONE ENCOUNTER
Dallin Loredo PA-C   to [facility]   AS    1/15/24  1:40 PM   No problem, 90 days refill sent.  Patient due for follow-up with me in June 2024, sooner if needed.  She will need a previous release of information from neurology as well as prior PCP office.    Dallin Loredo PA-C

## 2024-04-03 RX ORDER — DULOXETIN HYDROCHLORIDE 60 MG/1
60 CAPSULE, DELAYED RELEASE ORAL DAILY
Qty: 90 CAPSULE | Refills: 0 | OUTPATIENT
Start: 2024-04-03

## 2024-04-09 RX ORDER — DULOXETIN HYDROCHLORIDE 30 MG/1
30 CAPSULE, DELAYED RELEASE ORAL DAILY
Qty: 90 CAPSULE | Refills: 0 | OUTPATIENT
Start: 2024-04-09

## 2024-05-08 ENCOUNTER — LAB REQUISITION (OUTPATIENT)
Dept: LAB | Facility: CLINIC | Age: 49
End: 2024-05-08
Payer: COMMERCIAL

## 2024-05-08 DIAGNOSIS — Z13.1 ENCOUNTER FOR SCREENING FOR DIABETES MELLITUS: ICD-10-CM

## 2024-05-08 DIAGNOSIS — Z13.6 ENCOUNTER FOR SCREENING FOR CARDIOVASCULAR DISORDERS: ICD-10-CM

## 2024-05-08 PROCEDURE — 80061 LIPID PANEL: CPT | Mod: ORL | Performed by: STUDENT IN AN ORGANIZED HEALTH CARE EDUCATION/TRAINING PROGRAM

## 2024-05-08 PROCEDURE — 80048 BASIC METABOLIC PNL TOTAL CA: CPT | Mod: ORL | Performed by: STUDENT IN AN ORGANIZED HEALTH CARE EDUCATION/TRAINING PROGRAM

## 2024-05-09 LAB
ANION GAP SERPL CALCULATED.3IONS-SCNC: 5 MMOL/L (ref 7–15)
BUN SERPL-MCNC: 12.9 MG/DL (ref 6–20)
CALCIUM SERPL-MCNC: 8.9 MG/DL (ref 8.6–10)
CHLORIDE SERPL-SCNC: 103 MMOL/L (ref 98–107)
CHOLEST SERPL-MCNC: 243 MG/DL
CREAT SERPL-MCNC: 0.75 MG/DL (ref 0.51–0.95)
DEPRECATED HCO3 PLAS-SCNC: 29 MMOL/L (ref 22–29)
EGFRCR SERPLBLD CKD-EPI 2021: >90 ML/MIN/1.73M2
FASTING STATUS PATIENT QL REPORTED: ABNORMAL
FASTING STATUS PATIENT QL REPORTED: ABNORMAL
GLUCOSE SERPL-MCNC: 96 MG/DL (ref 70–99)
HDLC SERPL-MCNC: 60 MG/DL
LDLC SERPL CALC-MCNC: 149 MG/DL
NONHDLC SERPL-MCNC: 183 MG/DL
POTASSIUM SERPL-SCNC: 3.7 MMOL/L (ref 3.4–5.3)
SODIUM SERPL-SCNC: 137 MMOL/L (ref 135–145)
TRIGL SERPL-MCNC: 170 MG/DL

## 2024-09-21 ENCOUNTER — HEALTH MAINTENANCE LETTER (OUTPATIENT)
Age: 49
End: 2024-09-21

## 2024-10-08 ENCOUNTER — LAB REQUISITION (OUTPATIENT)
Dept: LAB | Facility: CLINIC | Age: 49
End: 2024-10-08
Payer: COMMERCIAL

## 2024-10-08 DIAGNOSIS — N39.0 URINARY TRACT INFECTION, SITE NOT SPECIFIED: ICD-10-CM

## 2024-10-08 PROCEDURE — 87186 SC STD MICRODIL/AGAR DIL: CPT | Mod: ORL | Performed by: FAMILY MEDICINE

## 2024-10-11 LAB — BACTERIA UR CULT: ABNORMAL

## 2025-01-17 ENCOUNTER — LAB REQUISITION (OUTPATIENT)
Dept: LAB | Facility: CLINIC | Age: 50
End: 2025-01-17
Payer: COMMERCIAL

## 2025-01-17 DIAGNOSIS — K80.50 CALCULUS OF BILE DUCT WITHOUT CHOLANGITIS OR CHOLECYSTITIS WITHOUT OBSTRUCTION: ICD-10-CM

## 2025-01-17 PROCEDURE — 82150 ASSAY OF AMYLASE: CPT | Mod: ORL

## 2025-01-17 PROCEDURE — 80076 HEPATIC FUNCTION PANEL: CPT | Mod: ORL

## 2025-01-17 PROCEDURE — 83690 ASSAY OF LIPASE: CPT | Mod: ORL

## 2025-01-18 LAB
ALBUMIN SERPL BCG-MCNC: 4.1 G/DL (ref 3.5–5.2)
ALP SERPL-CCNC: 89 U/L (ref 40–150)
ALT SERPL W P-5'-P-CCNC: 16 U/L (ref 0–50)
AMYLASE SERPL-CCNC: 28 U/L (ref 28–100)
AST SERPL W P-5'-P-CCNC: 19 U/L (ref 0–45)
BILIRUB DIRECT SERPL-MCNC: <0.2 MG/DL (ref 0–0.3)
BILIRUB SERPL-MCNC: 0.4 MG/DL
LIPASE SERPL-CCNC: 27 U/L (ref 13–60)
PROT SERPL-MCNC: 7.3 G/DL (ref 6.4–8.3)

## 2025-03-26 DIAGNOSIS — N39.0 RECURRENT UTI: ICD-10-CM

## 2025-03-27 RX ORDER — TRIMETHOPRIM 100 MG/1
TABLET ORAL
Qty: 90 TABLET | Refills: 0 | OUTPATIENT
Start: 2025-03-27

## 2025-03-27 NOTE — TELEPHONE ENCOUNTER
VM not set up, will send MyC message  Per chart review patient read MyC sent 1/15/25 advising of importance of this visit to avoid delays/denials

## 2025-03-27 NOTE — TELEPHONE ENCOUNTER
Called number in chart person that answered said she was not Mahogany and that there is no Mahogany at this number asked if number I dialed was persons number person just kept stating this was not Mahogany

## 2025-03-27 NOTE — TELEPHONE ENCOUNTER
Overdue for needed care. Please call to schedule. I saw her at my previous clinic, have  not seen at [facility]

## 2025-04-21 ENCOUNTER — APPOINTMENT (OUTPATIENT)
Dept: ULTRASOUND IMAGING | Facility: CLINIC | Age: 50
End: 2025-04-21
Attending: EMERGENCY MEDICINE
Payer: COMMERCIAL

## 2025-04-21 ENCOUNTER — HOSPITAL ENCOUNTER (EMERGENCY)
Facility: CLINIC | Age: 50
Discharge: HOME | End: 2025-04-21
Attending: EMERGENCY MEDICINE | Admitting: EMERGENCY MEDICINE
Payer: COMMERCIAL

## 2025-04-21 VITALS
WEIGHT: 250 LBS | RESPIRATION RATE: 19 BRPM | HEART RATE: 77 BPM | BODY MASS INDEX: 39.24 KG/M2 | SYSTOLIC BLOOD PRESSURE: 143 MMHG | HEIGHT: 67 IN | OXYGEN SATURATION: 100 % | DIASTOLIC BLOOD PRESSURE: 86 MMHG | TEMPERATURE: 98 F

## 2025-04-21 DIAGNOSIS — K80.50 BILIARY COLIC: ICD-10-CM

## 2025-04-21 LAB
ALBUMIN SERPL BCG-MCNC: 4 G/DL (ref 3.5–5.2)
ALP SERPL-CCNC: 94 U/L (ref 40–150)
ALT SERPL W P-5'-P-CCNC: 10 U/L (ref 0–50)
ANION GAP SERPL CALCULATED.3IONS-SCNC: 13 MMOL/L (ref 7–15)
AST SERPL W P-5'-P-CCNC: 24 U/L (ref 0–45)
BASOPHILS # BLD AUTO: 0.1 10E3/UL (ref 0–0.2)
BASOPHILS NFR BLD AUTO: 1 %
BILIRUB SERPL-MCNC: 0.2 MG/DL
BUN SERPL-MCNC: 11.5 MG/DL (ref 6–20)
CALCIUM SERPL-MCNC: 7.7 MG/DL (ref 8.8–10.4)
CHLORIDE SERPL-SCNC: 104 MMOL/L (ref 98–107)
CREAT SERPL-MCNC: 0.71 MG/DL (ref 0.51–0.95)
EGFRCR SERPLBLD CKD-EPI 2021: >90 ML/MIN/1.73M2
EOSINOPHIL # BLD AUTO: 0.4 10E3/UL (ref 0–0.7)
EOSINOPHIL NFR BLD AUTO: 3 %
ERYTHROCYTE [DISTWIDTH] IN BLOOD BY AUTOMATED COUNT: 13.6 % (ref 10–15)
GLUCOSE SERPL-MCNC: 146 MG/DL (ref 70–99)
HCO3 SERPL-SCNC: 23 MMOL/L (ref 22–29)
HCT VFR BLD AUTO: 32 % (ref 35–47)
HGB BLD-MCNC: 10.7 G/DL (ref 11.7–15.7)
IMM GRANULOCYTES # BLD: 0.3 10E3/UL
IMM GRANULOCYTES NFR BLD: 2 %
LIPASE SERPL-CCNC: 24 U/L (ref 13–60)
LYMPHOCYTES # BLD AUTO: 3.6 10E3/UL (ref 0.8–5.3)
LYMPHOCYTES NFR BLD AUTO: 31 %
MCH RBC QN AUTO: 29.2 PG (ref 26.5–33)
MCHC RBC AUTO-ENTMCNC: 33.4 G/DL (ref 31.5–36.5)
MCV RBC AUTO: 87 FL (ref 78–100)
MONOCYTES # BLD AUTO: 0.6 10E3/UL (ref 0–1.3)
MONOCYTES NFR BLD AUTO: 5 %
NEUTROPHILS # BLD AUTO: 7 10E3/UL (ref 1.6–8.3)
NEUTROPHILS NFR BLD AUTO: 59 %
NRBC # BLD AUTO: 0 10E3/UL
NRBC BLD AUTO-RTO: 0 /100
PLATELET # BLD AUTO: 313 10E3/UL (ref 150–450)
POTASSIUM SERPL-SCNC: 3.6 MMOL/L (ref 3.4–5.3)
PROT SERPL-MCNC: 7 G/DL (ref 6.4–8.3)
RBC # BLD AUTO: 3.66 10E6/UL (ref 3.8–5.2)
SODIUM SERPL-SCNC: 140 MMOL/L (ref 135–145)
WBC # BLD AUTO: 11.9 10E3/UL (ref 4–11)

## 2025-04-21 PROCEDURE — 96374 THER/PROPH/DIAG INJ IV PUSH: CPT

## 2025-04-21 PROCEDURE — 96361 HYDRATE IV INFUSION ADD-ON: CPT

## 2025-04-21 PROCEDURE — 85025 COMPLETE CBC W/AUTO DIFF WBC: CPT | Performed by: EMERGENCY MEDICINE

## 2025-04-21 PROCEDURE — 83690 ASSAY OF LIPASE: CPT | Performed by: EMERGENCY MEDICINE

## 2025-04-21 PROCEDURE — 258N000003 HC RX IP 258 OP 636: Performed by: EMERGENCY MEDICINE

## 2025-04-21 PROCEDURE — 36415 COLL VENOUS BLD VENIPUNCTURE: CPT | Performed by: EMERGENCY MEDICINE

## 2025-04-21 PROCEDURE — 250N000011 HC RX IP 250 OP 636: Mod: JZ | Performed by: EMERGENCY MEDICINE

## 2025-04-21 PROCEDURE — 96375 TX/PRO/DX INJ NEW DRUG ADDON: CPT

## 2025-04-21 PROCEDURE — 84295 ASSAY OF SERUM SODIUM: CPT | Performed by: EMERGENCY MEDICINE

## 2025-04-21 PROCEDURE — 99285 EMERGENCY DEPT VISIT HI MDM: CPT | Mod: 25

## 2025-04-21 PROCEDURE — 76705 ECHO EXAM OF ABDOMEN: CPT

## 2025-04-21 RX ORDER — KETOROLAC TROMETHAMINE 15 MG/ML
15 INJECTION, SOLUTION INTRAMUSCULAR; INTRAVENOUS ONCE
Status: COMPLETED | OUTPATIENT
Start: 2025-04-21 | End: 2025-04-21

## 2025-04-21 RX ORDER — ONDANSETRON 2 MG/ML
4 INJECTION INTRAMUSCULAR; INTRAVENOUS EVERY 30 MIN PRN
Status: DISCONTINUED | OUTPATIENT
Start: 2025-04-21 | End: 2025-04-21 | Stop reason: HOSPADM

## 2025-04-21 RX ADMIN — SODIUM CHLORIDE 1000 ML: 0.9 INJECTION, SOLUTION INTRAVENOUS at 03:11

## 2025-04-21 RX ADMIN — ONDANSETRON 4 MG: 2 INJECTION, SOLUTION INTRAMUSCULAR; INTRAVENOUS at 03:14

## 2025-04-21 RX ADMIN — KETOROLAC TROMETHAMINE 15 MG: 15 INJECTION, SOLUTION INTRAMUSCULAR; INTRAVENOUS at 03:13

## 2025-04-21 ASSESSMENT — COLUMBIA-SUICIDE SEVERITY RATING SCALE - C-SSRS
1. IN THE PAST MONTH, HAVE YOU WISHED YOU WERE DEAD OR WISHED YOU COULD GO TO SLEEP AND NOT WAKE UP?: NO
6. HAVE YOU EVER DONE ANYTHING, STARTED TO DO ANYTHING, OR PREPARED TO DO ANYTHING TO END YOUR LIFE?: NO
2. HAVE YOU ACTUALLY HAD ANY THOUGHTS OF KILLING YOURSELF IN THE PAST MONTH?: NO

## 2025-04-21 ASSESSMENT — ACTIVITIES OF DAILY LIVING (ADL)
ADLS_ACUITY_SCORE: 41
ADLS_ACUITY_SCORE: 41

## 2025-04-21 NOTE — ED TRIAGE NOTES
Pt presents to ED with c/o upper abdominal pain, that she believes is her gallbladder.  Pt had similar sx starting in 2020, having intermittent attacks every 6-7 months.  Tonight this pain woke her from sleep around 0130.  Pt reports eating [place] last night.   +Nausea.  Took tylenol PM a few hours ago.    Triage Assessment (Adult)       Row Name 04/21/25 0247          Triage Assessment    Airway WDL WDL        Respiratory WDL    Respiratory WDL WDL        Skin Circulation/Temperature WDL    Skin Circulation/Temperature WDL WDL        Cardiac WDL    Cardiac WDL WDL        Peripheral/Neurovascular WDL    Peripheral Neurovascular WDL WDL        Cognitive/Neuro/Behavioral WDL    Cognitive/Neuro/Behavioral WDL WDL

## 2025-04-21 NOTE — ED PROVIDER NOTES
EMERGENCY DEPARTMENT ENCOUNTER      NAME: Mahogany Leal  AGE: 50 year old female  YOB: 1975  MRN: [id number]  EVALUATION DATE & TIME: 4/21/2025  2:49 AM    PCP: Dallin Loredo    ED PROVIDER: Ryan Kemp M.D.      Chief Complaint   Patient presents with    Abdominal Pain         FINAL IMPRESSION:  1. Biliary colic          ED COURSE & MEDICAL DECISION MAKING:    Pertinent Labs & Imaging studies reviewed. (See chart for details)  50 year old female presents to the Emergency Department for evaluation of right upper quadrant abdominal pain.  Has had issues with this in the past.  I suspect gallstones.  Did do an ultrasound there is some sludge but no signs of cholecystitis.  Mild fatty liver.  LFTs are normal.  Bilirubin is normal.  Lipase is normal.  No signs of pancreatitis.  White count mildly elevated unclear significance.  Feeling better after IV Toradol.  I do think she safe for discharge home.  Will have her follow-up with surgery for elective cholecystectomy.  Return for worsening symptoms    2:51 AM I met with the patient to gather history and to perform my initial exam. I discussed the plan for care while in the Emergency Department.       At the conclusion of the encounter I discussed the results of all of the tests and the disposition. The questions were answered. The patient or family acknowledged understanding and was agreeable with the care plan.     Medical Decision Making    Discharge. No recommendations on prescription strength medication(s). See documentation for any additional details.    MIPS (CTPE, Dental pain, Elizalde, Sinusitis, Asthma/COPD, Head Trauma): Not Applicable    SEPSIS: None               MEDICATIONS GIVEN IN THE EMERGENCY:  Medications   ondansetron (ZOFRAN) injection 4 mg (4 mg Intravenous $Given 4/21/25 0313)   sodium chloride 0.9% BOLUS 1,000 mL (1,000 mLs Intravenous $New Bag 4/21/25 0311)   ketorolac (TORADOL) injection 15 mg (15 mg Intravenous $Given 4/21/25  "0313)       NEW PRESCRIPTIONS STARTED AT TODAY'S ER VISIT  New Prescriptions    No medications on file          =================================================================    HPI    Patient information was obtained from: Patient     Use of : N/A       Mahogany Leal is a 50 year old female with a pertinent history of gallstones (self reported) who presents to this ED for evaluation of abdominal pain     The patient reports she awoke from sleep one hour ago with right upper quadrant abdominal pain. The patient reports a history of gallstones and reports that this feels like a \"gallbladder attack\" that she has had in the past. The patient reports nausea as well. The patient notes some constipation, but states that this is chronic.    The patient denies vomiting, fever, difficulties with urination, or any other symptoms or complaints.     PAST MEDICAL HISTORY:  Past Medical History:   Diagnosis Date    Anxiety     Chronic headaches     Costochondritis     Genital herpes simplex type 1 infection     Mononucleosis     Recurrent UTI        PAST SURGICAL HISTORY:  Past Surgical History:   Procedure Laterality Date    CYSTOSCOPY N/A 12/13/2016    Procedure: CYSTOSCOPY;  Surgeon: Shari Allen MD;  Location: [facility];  Service:     MYOMECTOMY  04/2007    MYOMECTOMY  02/2012    pelviscopy, removal of uterine myomata, hysteroscopy    OTHER SURGICAL HISTORY  04/2002    leiomyoma    ZZC TOTAL ABDOM HYSTERECTOMY N/A 12/13/2016    Procedure: TOTAL  ABDOMINAL HYSTERECTOMY WITH BILATERAL SALPINGECTOMY;  Surgeon: Shari Allen MD;  Location: [facility];  Service: Gynecology           CURRENT MEDICATIONS:    Current Facility-Administered Medications   Medication Dose Route Frequency Provider Last Rate Last Admin    ondansetron (ZOFRAN) injection 4 mg  4 mg Intravenous Q30 Min PRN Ryan Kemp MD   4 mg at 04/21/25 0314     Current Outpatient Medications   Medication Sig Dispense Refill    " "DULoxetine (CYMBALTA) 30 MG capsule Take 1 capsule (30 mg) by mouth daily for 90 days 90 capsule 0    DULoxetine (CYMBALTA) 60 MG capsule Take 1 capsule (60 mg) by mouth daily for 90 days 90 capsule 0    Meloxicam 10 MG CAPS 1 tablet daily (Patient not taking: Reported on 5/25/2023)      nortriptyline (PAMELOR) 10 MG capsule 4 caps/night 360 capsule 1    predniSONE (DELTASONE) 20 MG tablet TAKE 2 TABLETS FOR 5 DAYS, THEN 1 TABLET FOR 5 DAYS (Patient not taking: Reported on 5/25/2023)      rizatriptan (MAXALT) 10 MG tablet Take 1 tablet (10 mg) by mouth at onset of headache for migraine May repeat in 2 hours. 18 tablet 1    trimethoprim (TRIMPEX) 100 MG tablet Take 1 tablet (100 mg) by mouth daily as needed (UTI prophylaxis) prn 90 tablet 0    VALACYCLOVIR HCL PO       verapamil ER (VERELAN) 120 MG 24 hr capsule Take 1 capsule (120 mg) by mouth At Bedtime 90 capsule 1         ALLERGIES:  Allergies   Allergen Reactions    Codeine Nausea    Sulfa Antibiotics Nausea       FAMILY HISTORY:  Family History   Problem Relation Age of Onset    Cancer Mother         Soft Palate    Aneurysm Mother         Brain    Nephrolithiasis Father     Colon Cancer Father 58.00    Colon Cancer Maternal Grandmother 65.00    Diabetes Maternal Grandfather     Cerebrovascular Disease Maternal Grandfather        SOCIAL HISTORY:   Social History     Socioeconomic History    Marital status: Single   Tobacco Use    Smoking status: Never    Smokeless tobacco: Never   Substance and Sexual Activity    Alcohol use: Not Currently     Comment: Alcoholic Drinks/day: rare    Drug use: No       VITALS:  BP (!) 142/76   Pulse 75   Temp 98  F (36.7  C) (Oral)   Resp 19   Ht 1.702 m (5' 7\")   Wt 113.4 kg (250 lb)   SpO2 98%   BMI 39.16 kg/m      PHYSICAL EXAM    Physical Exam  Vitals and nursing note reviewed.   Constitutional:       General: She is not in acute distress.     Appearance: She is not diaphoretic.   HENT:      Head: Atraumatic.      " Mouth/Throat:      Pharynx: No oropharyngeal exudate.   Eyes:      General: No scleral icterus.     Pupils: Pupils are equal, round, and reactive to light.   Cardiovascular:      Rate and Rhythm: Normal rate and regular rhythm.      Heart sounds: Normal heart sounds.   Pulmonary:      Effort: No respiratory distress.      Breath sounds: Normal breath sounds.   Abdominal:      Palpations: Abdomen is soft.      Tenderness: There is abdominal tenderness in the right upper quadrant. There is no guarding or rebound. Negative signs include Saldaña's sign.   Musculoskeletal:         General: No tenderness.   Skin:     General: Skin is warm.      Findings: No rash.   Neurological:      General: No focal deficit present.      Mental Status: She is alert.           LAB:  All pertinent labs reviewed and interpreted.  Labs Ordered and Resulted from Time of ED Arrival to Time of ED Departure   COMPREHENSIVE METABOLIC PANEL - Abnormal       Result Value    Sodium 140      Potassium 3.6      Carbon Dioxide (CO2) 23      Anion Gap 13      Urea Nitrogen 11.5      Creatinine 0.71      GFR Estimate >90      Calcium 7.7 (*)     Chloride 104      Glucose 146 (*)     Alkaline Phosphatase 94      AST 24      ALT 10      Protein Total 7.0      Albumin 4.0      Bilirubin Total 0.2     CBC WITH PLATELETS AND DIFFERENTIAL - Abnormal    WBC Count 11.9 (*)     RBC Count 3.66 (*)     Hemoglobin 10.7 (*)     Hematocrit 32.0 (*)     MCV 87      MCH 29.2      MCHC 33.4      RDW 13.6      Platelet Count 313      % Neutrophils 59      % Lymphocytes 31      % Monocytes 5      % Eosinophils 3      % Basophils 1      % Immature Granulocytes 2      NRBCs per 100 WBC 0      Absolute Neutrophils 7.0      Absolute Lymphocytes 3.6      Absolute Monocytes 0.6      Absolute Eosinophils 0.4      Absolute Basophils 0.1      Absolute Immature Granulocytes 0.3      Absolute NRBCs 0.0     LIPASE - Normal    Lipase 24         RADIOLOGY:  Reviewed all pertinent  imaging. Please see official radiology report.  US Abdomen Limited (RUQ)   Final Result   IMPRESSION:   1.  Layering sludge and small gallstones in the gallbladder. No evidence of acute cholecystitis.   2.  Liver is difficult to penetrate. Increased hepatic echogenicity, compatible with steatosis or other diffuse hepatocellular disease.                  I, Lesvia Hein, am serving as a scribe to document services personally performed by Dr. Ryan Kemp, based on my observation and the provider's statements to me. I, Ryan Kemp MD attest that Lesvia Hein is acting in a scribe capacity, has observed my performance of the services and has documented them in accordance with my direction.    Ryan Kemp M.D.  Emergency Medicine  [facility] EMERGENCY ROOM  [address] [address] [phone]  Dept: [phone]       Ryan Kemp MD  04/21/25 0447

## 2025-08-09 ENCOUNTER — HEALTH MAINTENANCE LETTER (OUTPATIENT)
Age: 50
End: 2025-08-09